# Patient Record
Sex: MALE | Race: WHITE | NOT HISPANIC OR LATINO | Employment: FULL TIME | URBAN - METROPOLITAN AREA
[De-identification: names, ages, dates, MRNs, and addresses within clinical notes are randomized per-mention and may not be internally consistent; named-entity substitution may affect disease eponyms.]

---

## 2019-06-25 ENCOUNTER — APPOINTMENT (OUTPATIENT)
Dept: RADIOLOGY | Facility: MEDICAL CENTER | Age: 39
End: 2019-06-25
Attending: EMERGENCY MEDICINE
Payer: MEDICAID

## 2019-06-25 ENCOUNTER — HOSPITAL ENCOUNTER (EMERGENCY)
Facility: MEDICAL CENTER | Age: 39
End: 2019-06-25
Attending: EMERGENCY MEDICINE
Payer: MEDICAID

## 2019-06-25 VITALS
HEIGHT: 70 IN | OXYGEN SATURATION: 94 % | TEMPERATURE: 98 F | WEIGHT: 173.72 LBS | BODY MASS INDEX: 24.87 KG/M2 | RESPIRATION RATE: 16 BRPM | DIASTOLIC BLOOD PRESSURE: 66 MMHG | HEART RATE: 64 BPM | SYSTOLIC BLOOD PRESSURE: 103 MMHG

## 2019-06-25 DIAGNOSIS — S69.91XA INJURY OF RIGHT WRIST, INITIAL ENCOUNTER: ICD-10-CM

## 2019-06-25 PROCEDURE — 99283 EMERGENCY DEPT VISIT LOW MDM: CPT

## 2019-06-25 PROCEDURE — 73110 X-RAY EXAM OF WRIST: CPT | Mod: RT

## 2019-06-25 NOTE — ED NOTES
Pt discharged, discharge instructions given. Splint in place. Verbalizes understanding. VSS on RA. All belongings accounted for. Pt ambulated with steady gait to ED lobby.

## 2019-06-25 NOTE — DISCHARGE INSTRUCTIONS
Your x-ray appears normal.  Use the wrist brace at all times, except when showering, for the next week.  Use the contact information above to schedule follow-up with orthopedics if pain persists for more than 7 to 10 days.

## 2019-06-25 NOTE — ED TRIAGE NOTES
"Chief Complaint   Patient presents with   • Wrist Pain     R side. Pt states he hurt his wrist 2 days ago while playing with his kids. Reports pain with movement. Denies numbness or tingling.     /65   Pulse 66   Temp 36.7 °C (98 °F) (Temporal)   Resp 14   Ht 1.778 m (5' 10\")   Wt 78.8 kg (173 lb 11.6 oz)   SpO2 94%   BMI 24.93 kg/m²     Pt ambulated into triage, pt reports having similar injury ~2months ago and was seen at [facility] with no fracture. VS as above, NAD, encouraged to return to the triage nurse or tech with any new complaints or symptoms.  "

## 2019-06-25 NOTE — ED PROVIDER NOTES
"ED Provider Note    Scribed for Sushant Wheatley M.D. by Esperanza Black. 6/25/2019  2:20 PM    CHIEF COMPLAINT  Chief Complaint   Patient presents with   • Wrist Pain     R side. Pt states he hurt his wrist 2 days ago while playing with his kids. Reports pain with movement. Denies numbness or tingling.       HPI  Fortino Song is a 38 y.o. male who presents to the Emergency Room complaining of right wrist pain onset 2 days ago. The patient states he was playing with his kids when the pain began shortly after. He sustained a sprain to the same wrist 2-3 months ago which is in the process of healing. He states the pain is exacerbated with movement and palpation. No known drug allergies.      REVIEW OF SYSTEMS  See HPI for further details.    PAST MEDICAL HISTORY   has a past medical history of Back pain; Broken tooth; HIV (human immunodeficiency virus infection) (HCC); Psychiatric disorder; and Substance abuse ([facility]).    SOCIAL HISTORY  Social History     Social History Main Topics   • Smoking status: Current Every Day Smoker     Packs/day: 0.50     Years: 20.00     Types: Cigarettes   • Smokeless tobacco: Never Used   • Alcohol use No      Comment: occ; quit \"a while ago\"   • Drug use: Yes     Types: Oral, Inhaled, Intravenous      Comment: meth.marijuana, ecstasy   • Sexual activity: Not on file       SURGICAL HISTORY   has a past surgical history that includes exploratory laparotomy (N/A, 10/14/2015) and exploratory laparotomy (10/14/2015).    CURRENT MEDICATIONS  Home Medications     Reviewed by Adelaida Jasso R.N. (Registered Nurse) on 06/25/19 at 1411  Med List Status: Partial   Medication Last Dose Status   Atomoxetine HCl (STRATTERA PO)  Active   lamoTRIgine (LAMICTAL PO)  Active                ALLERGIES  Allergies   Allergen Reactions   • Nkda [No Known Drug Allergy]        PHYSICAL EXAM  VITAL SIGNS: /65   Pulse 66   Temp 36.7 °C (98 °F) (Temporal)   Resp 14   Ht 1.778 m (5' 10\")   Wt 78.8 " kg (173 lb 11.6 oz)   SpO2 94%   BMI 24.93 kg/m²   Pulse ox interpretation: I interpret this pulse ox as normal.  Constitutional: Alert in no apparent distress.  HENT: No signs of trauma, Bilateral external ears normal, Nose normal.   Eyes: Pupils are equal and reactive, Conjunctiva normal, Non-icteric.   Neck: Normal range of motion, No tenderness, Supple, No stridor.    Cardiovascular: Normal peripheral perfusion  Thorax & Lungs: Unlabored respirations, equal chest expansion, no accessory muscle use  Abdomen: Non-distended  Skin:  No erythema, No rash.   Back: Normal alignment and ROM  Extremities: No visible palpable abnormalities of hand or wrist. No scaphoid tenderness. Brisk pulses and capillary refill. Range of motion slightly limited by pain.   Musculoskeletal: Good range of motion in all major joints.   Neurologic: Alert, Normal motor function, No focal deficits noted.   Psychiatric: Affect normal, Judgment normal, Mood normal.    RADIOLOGY  DX-WRIST-COMPLETE 3+ RIGHT   Final Result      No acute fracture identified.  If pain persists, recommend repeat imaging in 7 to 10 days.          COURSE & MEDICAL DECISION MAKING   The patient's VS, Nurses notes reviewed. (See chart for details)    2:20 PM Patient seen and examined at bedside. Ordered for Dx- Wrist Right to evaluate. Differential diagnoses include but not limited to: fracture vs. Contusion vs. sprain    2:58 PM - Re-examined; The patient is resting in bed. I informed the patient that his x-ray is normal. He should keep his wrist in the brace at all times except when showering for 1 week. I discussed my plans for discharge with a referral to orthopedics. He was advised to follow up with his PCP and instructed to return to the ED if his symptoms worsen. Patient understands and agrees.        The patient will return for new or worsening symptoms and is stable at the time of discharge.        DISPOSITION:  Patient will be discharged home in stable  condition.    FOLLOW UP:  Han Tejada M.D.  [address]  [address]  [facility] [address]  [phone]                FINAL IMPRESSION  1. Injury of right wrist, initial encounter         IEsperanza (Scribe), am scribing for, and in the presence of, Sushant Wheatley M.D..    Electronically signed by: Esperanza Black (Scribe), 6/25/2019    I, Sushant Wheatley M.D. personally performed the services described in this documentation, as scribed by Esperanza Black in my presence, and it is both accurate and complete.  E  The note accurately reflects work and decisions made by me.  Sushant Wheatley  6/25/2019  4:46 PM

## 2019-09-08 ENCOUNTER — HOSPITAL ENCOUNTER (EMERGENCY)
Facility: MEDICAL CENTER | Age: 39
End: 2019-09-08
Attending: EMERGENCY MEDICINE
Payer: MEDICAID

## 2019-09-08 VITALS
HEIGHT: 70 IN | SYSTOLIC BLOOD PRESSURE: 110 MMHG | WEIGHT: 179.01 LBS | TEMPERATURE: 97.5 F | OXYGEN SATURATION: 96 % | RESPIRATION RATE: 16 BRPM | HEART RATE: 59 BPM | BODY MASS INDEX: 25.63 KG/M2 | DIASTOLIC BLOOD PRESSURE: 48 MMHG

## 2019-09-08 DIAGNOSIS — J03.90 TONSILLITIS: ICD-10-CM

## 2019-09-08 LAB — S PYO DNA SPEC NAA+PROBE: NOT DETECTED

## 2019-09-08 PROCEDURE — 700111 HCHG RX REV CODE 636 W/ 250 OVERRIDE (IP): Performed by: EMERGENCY MEDICINE

## 2019-09-08 PROCEDURE — 87651 STREP A DNA AMP PROBE: CPT

## 2019-09-08 PROCEDURE — 99284 EMERGENCY DEPT VISIT MOD MDM: CPT

## 2019-09-08 RX ORDER — DEXAMETHASONE SODIUM PHOSPHATE 10 MG/ML
10 INJECTION, SOLUTION INTRAMUSCULAR; INTRAVENOUS ONCE
Status: COMPLETED | OUTPATIENT
Start: 2019-09-08 | End: 2019-09-08

## 2019-09-08 RX ADMIN — DEXAMETHASONE SODIUM PHOSPHATE 10 MG: 10 INJECTION INTRAMUSCULAR; INTRAVENOUS at 07:44

## 2019-09-08 ASSESSMENT — LIFESTYLE VARIABLES: DO YOU DRINK ALCOHOL: NO

## 2019-09-08 NOTE — ED NOTES
Pt ambulatory with steady gait to GR 39, pt sitting on gurney, on monitor, call light in reach, chart up for ERP.

## 2019-09-08 NOTE — ED TRIAGE NOTES
Chief Complaint   Patient presents with   • Sore Throat     x 1 week with painful swallowing. Redness/ swelling noted to tonsills R>L, small white patches noted. Denies fever.      Pt ambulatory to triage with above complaint. Pt swallowing secretions without difficulty. Educated on triage process, encouraged to inform staff of any changes.

## 2019-09-08 NOTE — ED PROVIDER NOTES
"ED Provider Note    CHIEF COMPLAINT  Chief Complaint   Patient presents with   • Sore Throat     x 1 week with painful swallowing. Redness/ swelling noted to tonsills R>L, small white patches noted. Denies fever.        HPI  Fortino Song is a 38 y.o. male who presents with sore throat.  Started about 5 days ago.  Diffuse redness with a hoarse voice.  No fever.  Minimal cough.  No runny nose.  No difficulty breathing.  Thought maybe was exposed to some chemicals, but symptoms did not get better.  He does have a known exposure to hand-foot-and-mouth.  No headache or neck stiffness.  No chest pain.    REVIEW OF SYSTEMS  Pertinent negative: As above    PAST MEDICAL HISTORY  Past Medical History:   Diagnosis Date   • Back pain    • Broken tooth    • HIV (human immunodeficiency virus infection) (HCC)    • Psychiatric disorder     bipolar, schizo affective   • Substance abuse (HCC)      History of HIV.  Compliant with his antiretrovirals.  Last CD4 1150 by his report.    SOCIAL HISTORY  Social History     Tobacco Use   • Smoking status: Current Every Day Smoker     Packs/day: 0.50     Years: 20.00     Pack years: 10.00     Types: Cigarettes   • Smokeless tobacco: Current User   Substance Use Topics   • Alcohol use: No     Comment: occ; quit \"a while ago\"   • Drug use: Yes     Types: Oral, Inhaled     Comment: marijuana       SURGICAL HISTORY  Past Surgical History:   Procedure Laterality Date   • EXPLORATORY LAPAROTOMY N/A 10/14/2015    Procedure: EXPLORATORY LAPAROTOMY;  Surgeon: Mo Khan M.D.;  Location: SURGERY [facility];  Service:    • EXPLORATORY LAPAROTOMY  10/14/2015       ALLERGIES  Allergies   Allergen Reactions   • Nkda [No Known Drug Allergy]        PHYSICAL EXAM  VITAL SIGNS: /48   Pulse (!) 59   Temp 36.4 °C (97.5 °F) (Temporal)   Resp 16   Ht 1.778 m (5' 10\")   Wt 81.2 kg (179 lb 0.2 oz)   SpO2 96%   BMI 25.69 kg/m²    Constitutional: Awake and alert. Nontoxic  HENT: Minimal diffuse " pharyngeal erythema with a few small white patches on both tonsils.  The right tonsil is slightly larger than the left without abscess.  No uvular shift.  Airway is widely patent.  Eyes: Grossly normal  Neck: Normal range of motion, no lymphadenopathy  Cardiovascular: Normal heart rate   Thorax & Lungs: No respiratory distress, lung fields are clear without wheezes rales or rhonchi.  He does have a hoarse voice  Abdomen: Nontender  Skin:  No pathologic rash.   Extremities: Well perfused  Psychiatric: Affect normal      COURSE & MEDICAL DECISION MAKING  Patient presents with pharyngitis/tonsillitis.  Strep swab was obtained and is negative.  I suspect this is a viral process.  Does not appear to be candidal.  He has been compliant with his antiretrovirals.  At this point management is symptomatic.  He was given oral Decadron while in the ER to decrease inflammation.  Of advised push fluids and Tylenol as needed.  He should return to the ER for fevers, any double to breathing, worsening symptoms not improving.  I advised recheck with primary in 1 week if not well.    FINAL IMPRESSION  1.  Viral pharyngitis/tonsillitis      Disposition: home in good condition      This dictation was created using voice recognition software. The accuracy of the dictation is limited to the abilities of the software.  The nursing notes were reviewed and certain aspects of this information were incorporated into this note.      Electronically signed by: Jose Alberto Nuñez, 9/8/2019 7:43 AM

## 2020-01-12 ENCOUNTER — HOSPITAL ENCOUNTER (EMERGENCY)
Facility: MEDICAL CENTER | Age: 40
End: 2020-01-12
Attending: EMERGENCY MEDICINE
Payer: MEDICAID

## 2020-01-12 ENCOUNTER — APPOINTMENT (OUTPATIENT)
Dept: RADIOLOGY | Facility: MEDICAL CENTER | Age: 40
End: 2020-01-12
Attending: EMERGENCY MEDICINE
Payer: MEDICAID

## 2020-01-12 VITALS
WEIGHT: 160.5 LBS | BODY MASS INDEX: 22.98 KG/M2 | OXYGEN SATURATION: 99 % | HEIGHT: 70 IN | RESPIRATION RATE: 16 BRPM | SYSTOLIC BLOOD PRESSURE: 121 MMHG | DIASTOLIC BLOOD PRESSURE: 71 MMHG | TEMPERATURE: 98.1 F | HEART RATE: 70 BPM

## 2020-01-12 DIAGNOSIS — S99.922A INJURY OF LEFT FOOT, INITIAL ENCOUNTER: ICD-10-CM

## 2020-01-12 DIAGNOSIS — S69.92XA INJURY OF LEFT WRIST, INITIAL ENCOUNTER: ICD-10-CM

## 2020-01-12 PROCEDURE — 90471 IMMUNIZATION ADMIN: CPT

## 2020-01-12 PROCEDURE — 700111 HCHG RX REV CODE 636 W/ 250 OVERRIDE (IP): Performed by: EMERGENCY MEDICINE

## 2020-01-12 PROCEDURE — 73110 X-RAY EXAM OF WRIST: CPT | Mod: LT

## 2020-01-12 PROCEDURE — 99283 EMERGENCY DEPT VISIT LOW MDM: CPT

## 2020-01-12 PROCEDURE — 90715 TDAP VACCINE 7 YRS/> IM: CPT | Performed by: EMERGENCY MEDICINE

## 2020-01-12 RX ADMIN — CLOSTRIDIUM TETANI TOXOID ANTIGEN (FORMALDEHYDE INACTIVATED), CORYNEBACTERIUM DIPHTHERIAE TOXOID ANTIGEN (FORMALDEHYDE INACTIVATED), BORDETELLA PERTUSSIS TOXOID ANTIGEN (GLUTARALDEHYDE INACTIVATED), BORDETELLA PERTUSSIS FILAMENTOUS HEMAGGLUTININ ANTIGEN (FORMALDEHYDE INACTIVATED), BORDETELLA PERTUSSIS PERTACTIN ANTIGEN, AND BORDETELLA PERTUSSIS FIMBRIAE 2/3 ANTIGEN 0.5 ML: 5; 2; 2.5; 5; 3; 5 INJECTION, SUSPENSION INTRAMUSCULAR at 13:37

## 2020-01-12 NOTE — ED TRIAGE NOTES
Ambulates to triage  Chief Complaint   Patient presents with   • Wrist Pain     injured his L wrist 1 week ago at work and has had pain and numbness shooting down his hand     No swelling noted, he hit it on a laundry bin last week. Hiro said he stepped on a nail yesterday, unknown when his last tetanus shot was.

## 2020-01-12 NOTE — ED NOTES
Pt brought back to rm PUR 75 from triage. Pt able to transfer self to bed, family at bedside, call light in reach. ERP at bedside.

## 2020-01-12 NOTE — DISCHARGE INSTRUCTIONS
Apply warm and cold packs to the area that hurts and use Tylenol and Motrin for discomfort.  If your symptoms have not completely resolved within 1 week call the orthopedic clinic and arrange follow-up

## 2020-01-12 NOTE — ED PROVIDER NOTES
"ED Provider Note    CHIEF COMPLAINT  Chief Complaint   Patient presents with   • Wrist Pain     injured his L wrist 1 week ago at work and has had pain and numbness shooting down his hand       HPI  Fortino Song is a 39 y.o. male who presents to the emergency department complaining of left wrist pain.  The patient states that he struck his left wrist on the ulnar side on a laundry bin at work more than 1 week ago since that time he has had pain on the ulnar aspect of the wrist and the pain shoots down into the hand and he feels the numbness in the hand when this happens.  In addition to this the patient says [place] but he stepped on a nail that went through the bottom of the shoe and struck the bottom of his left foot but it did not actually leave a puncture wound.    REVIEW OF SYSTEMS no other injuries or mechanisms    PAST MEDICAL HISTORY  Past Medical History:   Diagnosis Date   • Back pain    • Broken tooth    • HIV (human immunodeficiency virus infection) ([facility])    • Psychiatric disorder     bipolar, schizo affective   • Substance abuse ([facility])        FAMILY HISTORY  History reviewed. No pertinent family history.    SOCIAL HISTORY  Social History     Socioeconomic History   • Marital status:      Spouse name: Not on file   • Number of children: Not on file   • Years of education: Not on file   • Highest education level: Not on file   Occupational History   • Not on file   Social Needs   • Financial resource strain: Not on file   • Food insecurity:     Worry: Not on file     Inability: Not on file   • Transportation needs:     Medical: Not on file     Non-medical: Not on file   Tobacco Use   • Smoking status: Current Every Day Smoker     Packs/day: 0.50     Years: 20.00     Pack years: 10.00     Types: Cigarettes   • Smokeless tobacco: Current User   Substance and Sexual Activity   • Alcohol use: No     Comment: occ; quit \"a while ago\"   • Drug use: Not Currently     Types: Oral, " "Inhaled     Comment: marijuana   • Sexual activity: Not on file   Lifestyle   • Physical activity:     Days per week: Not on file     Minutes per session: Not on file   • Stress: Not on file   Relationships   • Social connections:     Talks on phone: Not on file     Gets together: Not on file     Attends Yarsani service: Not on file     Active member of club or organization: Not on file     Attends meetings of clubs or organizations: Not on file     Relationship status: Not on file   • Intimate partner violence:     Fear of current or ex partner: Not on file     Emotionally abused: Not on file     Physically abused: Not on file     Forced sexual activity: Not on file   Other Topics Concern   • Not on file   Social History Narrative    ** Merged History Encounter **            SURGICAL HISTORY  Past Surgical History:   Procedure Laterality Date   • EXPLORATORY LAPAROTOMY N/A 10/14/2015    Procedure: EXPLORATORY LAPAROTOMY;  Surgeon: Mo Khan M.D.;  Location: SURGERY [facility];  Service:    • EXPLORATORY LAPAROTOMY  10/14/2015       CURRENT MEDICATIONS  Home Medications     Reviewed by Adelaida Jasso R.N. (Registered Nurse) on 01/12/20 at 1307  Med List Status: Complete   Medication Last Dose Status        Patient Alejandro Taking any Medications                       ALLERGIES  Allergies   Allergen Reactions   • Nkda [No Known Drug Allergy]        PHYSICAL EXAM  VITAL SIGNS: /71   Pulse 70   Temp 36.7 °C (98.1 °F) (Temporal)   Resp 16   Ht 1.778 m (5' 10\")   Wt 72.8 kg (160 lb 7.9 oz)   SpO2 99%   BMI 23.03 kg/m²    Oxygen saturation is interpreted as adequate  Constitutional: Awake verbal nontoxic-appearing  Musculoskeletal: The left wrist looks normal there is no swelling or erythema but he does have tenderness over the ulnar aspect of the wrist.  Motor function is intact in the hand.  There is no tenderness of the proximal forearm or elbow.  On the left foot there is no evidence of " puncture wound or erythema or swelling    Radiology  DX-WRIST-COMPLETE 3+ LEFT   Final Result      1.   Unremarkable left wrist series.        MEDICAL DECISION MAKING and DISPOSITION  In the emergency department the patient's tetanus booster was updated.  I have recommended Tylenol and Motrin as well as warm and cold compresses on the area that hurts.  He is to call Dr. Damon's office and arrange orthopedic follow-up during the week for his left wrist injury if it is not completely better by then.  I have filled out a C4 injury form for this patient    IMPRESSION  1.  Left wrist injury  2.  Left foot injury         Electronically signed by: Jung Clayton, 1/12/2020 2:15 PM

## 2020-02-01 ENCOUNTER — HOSPITAL ENCOUNTER (INPATIENT)
Facility: MEDICAL CENTER | Age: 40
LOS: 3 days | DRG: 872 | End: 2020-02-04
Attending: EMERGENCY MEDICINE | Admitting: INTERNAL MEDICINE
Payer: MEDICAID

## 2020-02-01 ENCOUNTER — APPOINTMENT (OUTPATIENT)
Dept: RADIOLOGY | Facility: MEDICAL CENTER | Age: 40
DRG: 872 | End: 2020-02-01
Attending: EMERGENCY MEDICINE
Payer: MEDICAID

## 2020-02-01 DIAGNOSIS — R50.9 ACUTE FEBRILE ILLNESS: ICD-10-CM

## 2020-02-01 DIAGNOSIS — B20 SYMPTOMATIC HIV INFECTION (HCC): ICD-10-CM

## 2020-02-01 DIAGNOSIS — J02.0 STREP PHARYNGITIS: ICD-10-CM

## 2020-02-01 PROBLEM — F17.200 SMOKING: Status: ACTIVE | Noted: 2020-02-01

## 2020-02-01 PROBLEM — Z21 HIV (HUMAN IMMUNODEFICIENCY VIRUS INFECTION) (HCC): Status: ACTIVE | Noted: 2020-02-01

## 2020-02-01 PROBLEM — A41.9 SEPSIS (HCC): Status: ACTIVE | Noted: 2020-02-01

## 2020-02-01 PROBLEM — F31.9 BIPOLAR DISORDER (HCC): Status: ACTIVE | Noted: 2020-02-01

## 2020-02-01 PROBLEM — E87.1 HYPONATREMIA: Status: ACTIVE | Noted: 2020-02-01

## 2020-02-01 PROBLEM — R73.9 HYPERGLYCEMIA: Status: ACTIVE | Noted: 2020-02-01

## 2020-02-01 LAB
ALBUMIN SERPL BCP-MCNC: 4.5 G/DL (ref 3.2–4.9)
ALBUMIN/GLOB SERPL: 1.3 G/DL
ALP SERPL-CCNC: 70 U/L (ref 30–99)
ALT SERPL-CCNC: 35 U/L (ref 2–50)
ANION GAP SERPL CALC-SCNC: 9 MMOL/L (ref 0–11.9)
APPEARANCE UR: CLEAR
AST SERPL-CCNC: 25 U/L (ref 12–45)
BACTERIA #/AREA URNS HPF: NEGATIVE /HPF
BASOPHILS # BLD AUTO: 0.4 % (ref 0–1.8)
BASOPHILS # BLD: 0.08 K/UL (ref 0–0.12)
BILIRUB SERPL-MCNC: 0.8 MG/DL (ref 0.1–1.5)
BILIRUB UR QL STRIP.AUTO: NEGATIVE
BUN SERPL-MCNC: 16 MG/DL (ref 8–22)
CALCIUM SERPL-MCNC: 10 MG/DL (ref 8.5–10.5)
CHLORIDE SERPL-SCNC: 98 MMOL/L (ref 96–112)
CO2 SERPL-SCNC: 22 MMOL/L (ref 20–33)
COLOR UR: YELLOW
CREAT SERPL-MCNC: 1.05 MG/DL (ref 0.5–1.4)
EOSINOPHIL # BLD AUTO: 0 K/UL (ref 0–0.51)
EOSINOPHIL NFR BLD: 0 % (ref 0–6.9)
EPI CELLS #/AREA URNS HPF: NEGATIVE /HPF
ERYTHROCYTE [DISTWIDTH] IN BLOOD BY AUTOMATED COUNT: 42.2 FL (ref 35.9–50)
FLUAV RNA SPEC QL NAA+PROBE: NEGATIVE
FLUBV RNA SPEC QL NAA+PROBE: NEGATIVE
GLOBULIN SER CALC-MCNC: 3.5 G/DL (ref 1.9–3.5)
GLUCOSE SERPL-MCNC: 147 MG/DL (ref 65–99)
GLUCOSE UR STRIP.AUTO-MCNC: NEGATIVE MG/DL
HCT VFR BLD AUTO: 45.6 % (ref 42–52)
HGB BLD-MCNC: 15.7 G/DL (ref 14–18)
HYALINE CASTS #/AREA URNS LPF: ABNORMAL /LPF
IMM GRANULOCYTES # BLD AUTO: 0.16 K/UL (ref 0–0.11)
IMM GRANULOCYTES NFR BLD AUTO: 0.7 % (ref 0–0.9)
INR PPP: 1.09 (ref 0.87–1.13)
KETONES UR STRIP.AUTO-MCNC: NEGATIVE MG/DL
LACTATE BLD-SCNC: 1 MMOL/L (ref 0.5–2)
LACTATE BLD-SCNC: 1.3 MMOL/L (ref 0.5–2)
LEUKOCYTE ESTERASE UR QL STRIP.AUTO: NEGATIVE
LYMPHOCYTES # BLD AUTO: 2.2 K/UL (ref 1–4.8)
LYMPHOCYTES NFR BLD: 10.2 % (ref 22–41)
MCH RBC QN AUTO: 30.8 PG (ref 27–33)
MCHC RBC AUTO-ENTMCNC: 34.4 G/DL (ref 33.7–35.3)
MCV RBC AUTO: 89.4 FL (ref 81.4–97.8)
MICRO URNS: ABNORMAL
MONOCYTES # BLD AUTO: 1.13 K/UL (ref 0–0.85)
MONOCYTES NFR BLD AUTO: 5.2 % (ref 0–13.4)
NEUTROPHILS # BLD AUTO: 17.97 K/UL (ref 1.82–7.42)
NEUTROPHILS NFR BLD: 83.5 % (ref 44–72)
NITRITE UR QL STRIP.AUTO: NEGATIVE
NRBC # BLD AUTO: 0 K/UL
NRBC BLD-RTO: 0 /100 WBC
PH UR STRIP.AUTO: 5.5 [PH] (ref 5–8)
PLATELET # BLD AUTO: 179 K/UL (ref 164–446)
PMV BLD AUTO: 9.6 FL (ref 9–12.9)
POTASSIUM SERPL-SCNC: 4.4 MMOL/L (ref 3.6–5.5)
PROT SERPL-MCNC: 8 G/DL (ref 6–8.2)
PROT UR QL STRIP: NEGATIVE MG/DL
PROTHROMBIN TIME: 14.3 SEC (ref 12–14.6)
RBC # BLD AUTO: 5.1 M/UL (ref 4.7–6.1)
RBC # URNS HPF: ABNORMAL /HPF
RBC UR QL AUTO: ABNORMAL
S PYO DNA SPEC NAA+PROBE: DETECTED
SODIUM SERPL-SCNC: 129 MMOL/L (ref 135–145)
SP GR UR STRIP.AUTO: 1.01
UROBILINOGEN UR STRIP.AUTO-MCNC: 0.2 MG/DL
WBC # BLD AUTO: 21.5 K/UL (ref 4.8–10.8)
WBC #/AREA URNS HPF: ABNORMAL /HPF

## 2020-02-01 PROCEDURE — 700105 HCHG RX REV CODE 258: Performed by: EMERGENCY MEDICINE

## 2020-02-01 PROCEDURE — 87502 INFLUENZA DNA AMP PROBE: CPT

## 2020-02-01 PROCEDURE — 85025 COMPLETE CBC W/AUTO DIFF WBC: CPT

## 2020-02-01 PROCEDURE — 85610 PROTHROMBIN TIME: CPT

## 2020-02-01 PROCEDURE — 83036 HEMOGLOBIN GLYCOSYLATED A1C: CPT

## 2020-02-01 PROCEDURE — 87651 STREP A DNA AMP PROBE: CPT

## 2020-02-01 PROCEDURE — 87086 URINE CULTURE/COLONY COUNT: CPT

## 2020-02-01 PROCEDURE — 83605 ASSAY OF LACTIC ACID: CPT

## 2020-02-01 PROCEDURE — 700102 HCHG RX REV CODE 250 W/ 637 OVERRIDE(OP): Performed by: INTERNAL MEDICINE

## 2020-02-01 PROCEDURE — 80053 COMPREHEN METABOLIC PANEL: CPT

## 2020-02-01 PROCEDURE — 96375 TX/PRO/DX INJ NEW DRUG ADDON: CPT

## 2020-02-01 PROCEDURE — 87040 BLOOD CULTURE FOR BACTERIA: CPT

## 2020-02-01 PROCEDURE — 99223 1ST HOSP IP/OBS HIGH 75: CPT | Mod: 25 | Performed by: INTERNAL MEDICINE

## 2020-02-01 PROCEDURE — 99406 BEHAV CHNG SMOKING 3-10 MIN: CPT | Mod: 25 | Performed by: INTERNAL MEDICINE

## 2020-02-01 PROCEDURE — 96365 THER/PROPH/DIAG IV INF INIT: CPT

## 2020-02-01 PROCEDURE — 700102 HCHG RX REV CODE 250 W/ 637 OVERRIDE(OP)

## 2020-02-01 PROCEDURE — 36415 COLL VENOUS BLD VENIPUNCTURE: CPT

## 2020-02-01 PROCEDURE — 71045 X-RAY EXAM CHEST 1 VIEW: CPT

## 2020-02-01 PROCEDURE — A9270 NON-COVERED ITEM OR SERVICE: HCPCS

## 2020-02-01 PROCEDURE — 700105 HCHG RX REV CODE 258: Performed by: INTERNAL MEDICINE

## 2020-02-01 PROCEDURE — A9270 NON-COVERED ITEM OR SERVICE: HCPCS | Performed by: INTERNAL MEDICINE

## 2020-02-01 PROCEDURE — 99285 EMERGENCY DEPT VISIT HI MDM: CPT

## 2020-02-01 PROCEDURE — 770001 HCHG ROOM/CARE - MED/SURG/GYN PRIV*

## 2020-02-01 PROCEDURE — 700111 HCHG RX REV CODE 636 W/ 250 OVERRIDE (IP): Performed by: EMERGENCY MEDICINE

## 2020-02-01 PROCEDURE — 81001 URINALYSIS AUTO W/SCOPE: CPT

## 2020-02-01 RX ORDER — ACETAMINOPHEN 325 MG/1
650 TABLET ORAL ONCE
Status: COMPLETED | OUTPATIENT
Start: 2020-02-01 | End: 2020-02-01

## 2020-02-01 RX ORDER — SODIUM CHLORIDE, SODIUM LACTATE, POTASSIUM CHLORIDE, AND CALCIUM CHLORIDE .6; .31; .03; .02 G/100ML; G/100ML; G/100ML; G/100ML
1000 INJECTION, SOLUTION INTRAVENOUS
Status: DISCONTINUED | OUTPATIENT
Start: 2020-02-01 | End: 2020-02-04 | Stop reason: HOSPADM

## 2020-02-01 RX ORDER — LORAZEPAM 0.5 MG/1
0.5 TABLET ORAL
Status: DISCONTINUED | OUTPATIENT
Start: 2020-02-01 | End: 2020-02-04 | Stop reason: HOSPADM

## 2020-02-01 RX ORDER — POLYETHYLENE GLYCOL 3350 17 G/17G
1 POWDER, FOR SOLUTION ORAL
Status: DISCONTINUED | OUTPATIENT
Start: 2020-02-01 | End: 2020-02-04 | Stop reason: HOSPADM

## 2020-02-01 RX ORDER — SODIUM CHLORIDE 9 MG/ML
INJECTION, SOLUTION INTRAVENOUS CONTINUOUS
Status: DISCONTINUED | OUTPATIENT
Start: 2020-02-01 | End: 2020-02-03

## 2020-02-01 RX ORDER — ONDANSETRON 2 MG/ML
4 INJECTION INTRAMUSCULAR; INTRAVENOUS ONCE
Status: ACTIVE | OUTPATIENT
Start: 2020-02-01 | End: 2020-02-02

## 2020-02-01 RX ORDER — PROCHLORPERAZINE EDISYLATE 5 MG/ML
5-10 INJECTION INTRAMUSCULAR; INTRAVENOUS EVERY 4 HOURS PRN
Status: DISCONTINUED | OUTPATIENT
Start: 2020-02-01 | End: 2020-02-04 | Stop reason: HOSPADM

## 2020-02-01 RX ORDER — OXYCODONE HYDROCHLORIDE 10 MG/1
10 TABLET ORAL
Status: DISCONTINUED | OUTPATIENT
Start: 2020-02-01 | End: 2020-02-04 | Stop reason: HOSPADM

## 2020-02-01 RX ORDER — LAMOTRIGINE 50(42)-100
50 KIT ORAL 2 TIMES DAILY
COMMUNITY

## 2020-02-01 RX ORDER — GUAIFENESIN/DEXTROMETHORPHAN 100-10MG/5
10 SYRUP ORAL EVERY 6 HOURS PRN
Status: DISCONTINUED | OUTPATIENT
Start: 2020-02-01 | End: 2020-02-04 | Stop reason: HOSPADM

## 2020-02-01 RX ORDER — MORPHINE SULFATE 4 MG/ML
4 INJECTION, SOLUTION INTRAMUSCULAR; INTRAVENOUS
Status: DISCONTINUED | OUTPATIENT
Start: 2020-02-01 | End: 2020-02-04 | Stop reason: HOSPADM

## 2020-02-01 RX ORDER — OXYCODONE HYDROCHLORIDE 5 MG/1
5 TABLET ORAL
Status: DISCONTINUED | OUTPATIENT
Start: 2020-02-01 | End: 2020-02-04 | Stop reason: HOSPADM

## 2020-02-01 RX ORDER — ONDANSETRON 4 MG/1
4 TABLET, ORALLY DISINTEGRATING ORAL EVERY 4 HOURS PRN
Status: DISCONTINUED | OUTPATIENT
Start: 2020-02-01 | End: 2020-02-04 | Stop reason: HOSPADM

## 2020-02-01 RX ORDER — ONDANSETRON 2 MG/ML
4 INJECTION INTRAMUSCULAR; INTRAVENOUS ONCE
Status: COMPLETED | OUTPATIENT
Start: 2020-02-01 | End: 2020-02-01

## 2020-02-01 RX ORDER — AMOXICILLIN 250 MG
2 CAPSULE ORAL 2 TIMES DAILY
Status: DISCONTINUED | OUTPATIENT
Start: 2020-02-01 | End: 2020-02-04 | Stop reason: HOSPADM

## 2020-02-01 RX ORDER — LAMOTRIGINE 100 MG/1
50 TABLET ORAL 2 TIMES DAILY
Status: DISCONTINUED | OUTPATIENT
Start: 2020-02-01 | End: 2020-02-04 | Stop reason: HOSPADM

## 2020-02-01 RX ORDER — PROMETHAZINE HYDROCHLORIDE 25 MG/1
12.5-25 TABLET ORAL EVERY 4 HOURS PRN
Status: DISCONTINUED | OUTPATIENT
Start: 2020-02-01 | End: 2020-02-04 | Stop reason: HOSPADM

## 2020-02-01 RX ORDER — ACETAMINOPHEN 325 MG/1
650 TABLET ORAL EVERY 6 HOURS PRN
Status: DISCONTINUED | OUTPATIENT
Start: 2020-02-01 | End: 2020-02-04 | Stop reason: HOSPADM

## 2020-02-01 RX ORDER — ACETAMINOPHEN 500 MG
1000 TABLET ORAL EVERY 6 HOURS PRN
COMMUNITY

## 2020-02-01 RX ORDER — SODIUM CHLORIDE 9 MG/ML
1000 INJECTION, SOLUTION INTRAVENOUS ONCE
Status: COMPLETED | OUTPATIENT
Start: 2020-02-01 | End: 2020-02-01

## 2020-02-01 RX ORDER — EMTRICITABINE AND TENOFOVIR DISOPROXIL FUMARATE 200; 300 MG/1; MG/1
1 TABLET, FILM COATED ORAL DAILY
Status: DISCONTINUED | OUTPATIENT
Start: 2020-02-02 | End: 2020-02-04 | Stop reason: HOSPADM

## 2020-02-01 RX ORDER — SODIUM CHLORIDE, SODIUM LACTATE, POTASSIUM CHLORIDE, AND CALCIUM CHLORIDE .6; .31; .03; .02 G/100ML; G/100ML; G/100ML; G/100ML
30 INJECTION, SOLUTION INTRAVENOUS
Status: DISCONTINUED | OUTPATIENT
Start: 2020-02-01 | End: 2020-02-04 | Stop reason: HOSPADM

## 2020-02-01 RX ORDER — BISACODYL 10 MG
10 SUPPOSITORY, RECTAL RECTAL
Status: DISCONTINUED | OUTPATIENT
Start: 2020-02-01 | End: 2020-02-04 | Stop reason: HOSPADM

## 2020-02-01 RX ORDER — ONDANSETRON 2 MG/ML
4 INJECTION INTRAMUSCULAR; INTRAVENOUS EVERY 4 HOURS PRN
Status: DISCONTINUED | OUTPATIENT
Start: 2020-02-01 | End: 2020-02-04 | Stop reason: HOSPADM

## 2020-02-01 RX ORDER — NICOTINE 21 MG/24HR
14 PATCH, TRANSDERMAL 24 HOURS TRANSDERMAL
Status: DISCONTINUED | OUTPATIENT
Start: 2020-02-02 | End: 2020-02-04 | Stop reason: HOSPADM

## 2020-02-01 RX ORDER — PROMETHAZINE HYDROCHLORIDE 25 MG/1
12.5-25 SUPPOSITORY RECTAL EVERY 4 HOURS PRN
Status: DISCONTINUED | OUTPATIENT
Start: 2020-02-01 | End: 2020-02-04 | Stop reason: HOSPADM

## 2020-02-01 RX ADMIN — SODIUM CHLORIDE: 9 INJECTION, SOLUTION INTRAVENOUS at 22:01

## 2020-02-01 RX ADMIN — ACETAMINOPHEN 650 MG: 325 TABLET, FILM COATED ORAL at 22:07

## 2020-02-01 RX ADMIN — OXYCODONE HYDROCHLORIDE 5 MG: 5 TABLET ORAL at 22:52

## 2020-02-01 RX ADMIN — LORAZEPAM 0.5 MG: 0.5 TABLET ORAL at 23:59

## 2020-02-01 RX ADMIN — CEFTRIAXONE SODIUM 2 G: 2 INJECTION, POWDER, FOR SOLUTION INTRAMUSCULAR; INTRAVENOUS at 17:47

## 2020-02-01 RX ADMIN — ACETAMINOPHEN 650 MG: 325 TABLET, FILM COATED ORAL at 15:07

## 2020-02-01 RX ADMIN — SODIUM CHLORIDE 1000 ML: 9 INJECTION, SOLUTION INTRAVENOUS at 17:47

## 2020-02-01 RX ADMIN — RALTEGRAVIR 400 MG: 400 TABLET, FILM COATED ORAL at 23:59

## 2020-02-01 RX ADMIN — ONDANSETRON 4 MG: 2 INJECTION INTRAMUSCULAR; INTRAVENOUS at 17:02

## 2020-02-01 RX ADMIN — LAMOTRIGINE 50 MG: 100 TABLET ORAL at 23:59

## 2020-02-01 ASSESSMENT — ENCOUNTER SYMPTOMS
DOUBLE VISION: 0
SPUTUM PRODUCTION: 1
NERVOUS/ANXIOUS: 0
DIARRHEA: 1
WEIGHT LOSS: 0
COUGH: 1
CHILLS: 0
TREMORS: 0
FEVER: 0
SPEECH CHANGE: 0
BRUISES/BLEEDS EASILY: 0
FOCAL WEAKNESS: 0
VOMITING: 1
HEADACHES: 0
PHOTOPHOBIA: 0
BACK PAIN: 0
NECK PAIN: 0
BLURRED VISION: 0
HEARTBURN: 0
SHORTNESS OF BREATH: 0
NAUSEA: 1
ORTHOPNEA: 0
HEMOPTYSIS: 0
SORE THROAT: 1
FLANK PAIN: 0
POLYDIPSIA: 0
HALLUCINATIONS: 0
PALPITATIONS: 0

## 2020-02-01 ASSESSMENT — LIFESTYLE VARIABLES
HOW MANY TIMES IN THE PAST YEAR HAVE YOU HAD 5 OR MORE DRINKS IN A DAY: 0
DOES PATIENT WANT TO STOP DRINKING: NO
CONSUMPTION TOTAL: NEGATIVE
AVERAGE NUMBER OF DAYS PER WEEK YOU HAVE A DRINK CONTAINING ALCOHOL: 0
EVER FELT BAD OR GUILTY ABOUT YOUR DRINKING: NO
ALCOHOL_USE: NO
TOTAL SCORE: 0
TOTAL SCORE: 0
EVER HAD A DRINK FIRST THING IN THE MORNING TO STEADY YOUR NERVES TO GET RID OF A HANGOVER: NO
ON A TYPICAL DAY WHEN YOU DRINK ALCOHOL HOW MANY DRINKS DO YOU HAVE: 0
HAVE YOU EVER FELT YOU SHOULD CUT DOWN ON YOUR DRINKING: NO
HAVE PEOPLE ANNOYED YOU BY CRITICIZING YOUR DRINKING: NO
SUBSTANCE_ABUSE: 0
EVER_SMOKED: YES
TOTAL SCORE: 0

## 2020-02-01 ASSESSMENT — COPD QUESTIONNAIRES
COPD SCREENING SCORE: 5
IN THE PAST 12 MONTHS DO YOU DO LESS THAN YOU USED TO BECAUSE OF YOUR BREATHING PROBLEMS: DISAGREE/UNSURE
DURING THE PAST 4 WEEKS HOW MUCH DID YOU FEEL SHORT OF BREATH: SOME OF THE TIME
HAVE YOU SMOKED AT LEAST 100 CIGARETTES IN YOUR ENTIRE LIFE: YES
DO YOU EVER COUGH UP ANY MUCUS OR PHLEGM?: YES, EVERY DAY

## 2020-02-01 ASSESSMENT — PATIENT HEALTH QUESTIONNAIRE - PHQ9
2. FEELING DOWN, DEPRESSED, IRRITABLE, OR HOPELESS: NOT AT ALL
1. LITTLE INTEREST OR PLEASURE IN DOING THINGS: NOT AT ALL
SUM OF ALL RESPONSES TO PHQ9 QUESTIONS 1 AND 2: 0

## 2020-02-01 ASSESSMENT — COGNITIVE AND FUNCTIONAL STATUS - GENERAL
DAILY ACTIVITIY SCORE: 24
SUGGESTED CMS G CODE MODIFIER MOBILITY: CH
SUGGESTED CMS G CODE MODIFIER DAILY ACTIVITY: CH
MOBILITY SCORE: 24

## 2020-02-01 NOTE — ED PROVIDER NOTES
"ED Provider Note    Scribed for Eric Kyle M.D. by Julia Roland. 2/1/2020  3:44 PM    Primary care provider: Pcp Pt States None  Means of arrival: Walk-in  History obtained from: Patient  History limited by: None    CHIEF COMPLAINT  Chief Complaint   Patient presents with   • Flu Like Symptoms   Fever, vomiting    HPI  Fortino Song is a 39 y.o. male with a history of HIV who presents to the Emergency Department for evaluation of flu-like symptoms onset two days ago. At bedside, the patient reports productive cough described as green-yellow in color, fever with T-max of 103 °F, vomiting, sore throat, nasuea, fever, dehydration, and generalized body aches, but denies any abdominal pain.     Patient has a history of HIV and is compliant with antiretroviral medication. He notes last CD4 count to be 1200. The patient states he is currently being seen at [facility]. Patient received the influenza vaccination this season.     REVIEW OF SYSTEMS  Pertinent positives include cough, fever, vomiting, sore throat, nasuea, fever, dehydration, and generalized body aches. Pertinent negatives include no abdominal pain.  All other systems reviewed and negative.    PAST MEDICAL HISTORY   has a past medical history of Back pain, Broken tooth, HIV (human immunodeficiency virus infection) ([facility]), Psychiatric disorder, Sepsis ([facility]) (2/1/2020), and Substance abuse ([facility]).    SURGICAL HISTORY   has a past surgical history that includes exploratory laparotomy (N/A, 10/14/2015) and exploratory laparotomy (10/14/2015).    SOCIAL HISTORY  Social History     Tobacco Use   • Smoking status: Current Every Day Smoker     Packs/day: 0.50     Years: 20.00     Pack years: 10.00     Types: Cigarettes   • Smokeless tobacco: Current User   Substance Use Topics   • Alcohol use: No     Comment: occ; quit \"a while ago\"   • Drug use: Not Currently     Types: Oral, Inhaled     Comment: marijuana      Social History     Substance and " "Sexual Activity   Drug Use Not Currently   • Types: Oral, Inhaled    Comment: marijuana       FAMILY HISTORY  No family history on file.    CURRENT MEDICATIONS  Home Medications     Reviewed by Schuyler B Collett, R.N. (Registered Nurse) on 02/01/20 at 1402  Med List Status: <None>   Medication Last Dose Status        Patient Alejandro Taking any Medications                       ALLERGIES  Allergies   Allergen Reactions   • Nkda [No Known Drug Allergy]        PHYSICAL EXAM  VITAL SIGNS: /72   Pulse (!) 104   Temp (!) 39.5 °C (103.1 °F) (Oral)   Resp 18   Ht 1.778 m (5' 10\")   Wt 75 kg (165 lb 5.5 oz)   SpO2 92%   BMI 23.72 kg/m²     Vital signs reviewed.  Constitutional: Ill-appearing.  Vomiting.  Head: Normocephalic.   Mouth/Throat: Oropharynx is clear and dry. Bilateral enlarged tonsils, right greater than left with exudate. No sign of peritonsillar abscess.   Eyes: EOM are normal. Pupils are equal, round, and reactive to light.   Neck: Normal range of motion. Neck supple.   Cardiovascular: Normal rate, regular rhythm and normal heart sounds.    Pulmonary/Chest: Effort normal and breath sounds normal. No wheezes.   Abdominal: Soft. There is no tenderness. There is no rebound and no guarding.   Musculoskeletal: Exhibits no edema.   Lymphadenopathy: Bilateral tender lymph nodes.   Neurological: Patient is alert and oriented to person, place, and time. CNs II - XII intact. DTRs intact. Normal sensation and strength.  Skin: Skin is diaphoretic.   Psychiatric: Patient has a normal mood and affect. Behavior is normal.       LABS  Results for orders placed or performed during the hospital encounter of 02/01/20   LACTIC ACID   Result Value Ref Range    Lactic Acid 1.0 0.5 - 2.0 mmol/L   CBC WITH DIFFERENTIAL   Result Value Ref Range    WBC 21.5 (H) 4.8 - 10.8 K/uL    RBC 5.10 4.70 - 6.10 M/uL    Hemoglobin 15.7 14.0 - 18.0 g/dL    Hematocrit 45.6 42.0 - 52.0 %    MCV 89.4 81.4 - 97.8 fL    MCH 30.8 27.0 - 33.0 " pg    MCHC 34.4 33.7 - 35.3 g/dL    RDW 42.2 35.9 - 50.0 fL    Platelet Count 179 164 - 446 K/uL    MPV 9.6 9.0 - 12.9 fL    Neutrophils-Polys 83.50 (H) 44.00 - 72.00 %    Lymphocytes 10.20 (L) 22.00 - 41.00 %    Monocytes 5.20 0.00 - 13.40 %    Eosinophils 0.00 0.00 - 6.90 %    Basophils 0.40 0.00 - 1.80 %    Immature Granulocytes 0.70 0.00 - 0.90 %    Nucleated RBC 0.00 /100 WBC    Neutrophils (Absolute) 17.97 (H) 1.82 - 7.42 K/uL    Lymphs (Absolute) 2.20 1.00 - 4.80 K/uL    Monos (Absolute) 1.13 (H) 0.00 - 0.85 K/uL    Eos (Absolute) 0.00 0.00 - 0.51 K/uL    Baso (Absolute) 0.08 0.00 - 0.12 K/uL    Immature Granulocytes (abs) 0.16 (H) 0.00 - 0.11 K/uL    NRBC (Absolute) 0.00 K/uL   COMP METABOLIC PANEL   Result Value Ref Range    Sodium 129 (L) 135 - 145 mmol/L    Potassium 4.4 3.6 - 5.5 mmol/L    Chloride 98 96 - 112 mmol/L    Co2 22 20 - 33 mmol/L    Anion Gap 9.0 0.0 - 11.9    Glucose 147 (H) 65 - 99 mg/dL    Bun 16 8 - 22 mg/dL    Creatinine 1.05 0.50 - 1.40 mg/dL    Calcium 10.0 8.5 - 10.5 mg/dL    AST(SGOT) 25 12 - 45 U/L    ALT(SGPT) 35 2 - 50 U/L    Alkaline Phosphatase 70 30 - 99 U/L    Total Bilirubin 0.8 0.1 - 1.5 mg/dL    Albumin 4.5 3.2 - 4.9 g/dL    Total Protein 8.0 6.0 - 8.2 g/dL    Globulin 3.5 1.9 - 3.5 g/dL    A-G Ratio 1.3 g/dL   URINALYSIS   Result Value Ref Range    Color Yellow     Character Clear     Specific Gravity 1.009 <1.035    Ph 5.5 5.0 - 8.0    Glucose Negative Negative mg/dL    Ketones Negative Negative mg/dL    Protein Negative Negative mg/dL    Bilirubin Negative Negative    Urobilinogen, Urine 0.2 Negative    Nitrite Negative Negative    Leukocyte Esterase Negative Negative    Occult Blood Small (A) Negative    Micro Urine Req Microscopic    Group A Strep by PCR   Result Value Ref Range    Group A Strep by PCR DETECTED (A) Not Detected   Influenza A/B By PCR (Adult - Flu Only)   Result Value Ref Range    Influenza virus A RNA Negative Negative    Influenza virus B, PCR  Negative Negative   ESTIMATED GFR   Result Value Ref Range    GFR If African American >60 >60 mL/min/1.73 m 2    GFR If Non African American >60 >60 mL/min/1.73 m 2   URINE MICROSCOPIC (W/UA)   Result Value Ref Range    WBC 0-2 (A) /hpf    RBC 0-2 (A) /hpf    Bacteria Negative None /hpf    Epithelial Cells Negative /hpf    Hyaline Cast 0-2 /lpf   Prothrombin time (INR)   Result Value Ref Range    PT 14.3 12.0 - 14.6 sec    INR 1.09 0.87 - 1.13     All labs reviewed by me.    RADIOLOGY  DX-CHEST-PORTABLE (1 VIEW)   Final Result         1. No acute cardiopulmonary abnormalities are identified.        The radiologist's interpretation of all radiological studies have been reviewed by me.    COURSE & MEDICAL DECISION MAKING  Pertinent Labs & Imaging studies reviewed. (See chart for details) The patient's [facility] and past medical  records were reviewed    3:44 PM - Patient seen and examined at bedside. Patient will be treated with Tylenol tablet 650 mg. The differential diagnoses include but are not limited to: Flu versus sepsis versus strep versus pneumonia.  Patient's strep test came back positive.  Patient's white count was elevated 21,000.  Given his HIV positive status and inability to tolerate anything by mouth patient will be admitted for IV antibiotics.  Patient had a markedly elevated white count.    4:15 PM - Ordered for DX chest, blood culture, urine culture, CMP, CBC with differential, and lactic acid.    4:57 PM - Patient will be treated with Zofran injection 4 mg.     5:11 PM - Patient will be treated with NS infusion 1L, Zofran injection 4 mg, and Rocephin. Ordered for Group A Strep by PCR, influenza A/B by PCR, and urine microscopic.     6:33 PM - Paged Hospitalist.     6:43 PM I discussed the patient's case and the above findings with Dr. Sun (Hospitalist) who agrees to admit the patient.     DISPOSITION:  Patient will be hospitalized by Dr. Sun (Hospitalist) in [facility]  condition.    FINAL IMPRESSION  1. Strep pharyngitis    2. Acute febrile illness    3. Symptomatic HIV infection (HCC)          I, Julia Roland (Aleta), am scribing for, and in the presence of, Eric Kyle M.D..    Electronically signed by: Julia Roland (Aleta), 2/1/2020    IEric M.D. personally performed the services described in this documentation, as scribed by Julia Roland in my presence, and it is both accurate and complete. C.     The note accurately reflects work and decisions made by me.  Eric Kyle M.D.  2/1/2020  8:14 PM

## 2020-02-02 PROBLEM — D72.829 LEUCOCYTOSIS: Status: ACTIVE | Noted: 2020-02-02

## 2020-02-02 LAB
ALBUMIN SERPL BCP-MCNC: 3.8 G/DL (ref 3.2–4.9)
ALBUMIN/GLOB SERPL: 1.3 G/DL
ALP SERPL-CCNC: 64 U/L (ref 30–99)
ALT SERPL-CCNC: 26 U/L (ref 2–50)
ANION GAP SERPL CALC-SCNC: 7 MMOL/L (ref 0–11.9)
AST SERPL-CCNC: 15 U/L (ref 12–45)
BASOPHILS # BLD AUTO: 0.3 % (ref 0–1.8)
BASOPHILS # BLD: 0.05 K/UL (ref 0–0.12)
BILIRUB SERPL-MCNC: 0.4 MG/DL (ref 0.1–1.5)
BUN SERPL-MCNC: 13 MG/DL (ref 8–22)
CALCIUM SERPL-MCNC: 9 MG/DL (ref 8.5–10.5)
CHLORIDE SERPL-SCNC: 106 MMOL/L (ref 96–112)
CO2 SERPL-SCNC: 23 MMOL/L (ref 20–33)
CREAT SERPL-MCNC: 0.83 MG/DL (ref 0.5–1.4)
EOSINOPHIL # BLD AUTO: 0.05 K/UL (ref 0–0.51)
EOSINOPHIL NFR BLD: 0.3 % (ref 0–6.9)
ERYTHROCYTE [DISTWIDTH] IN BLOOD BY AUTOMATED COUNT: 42.8 FL (ref 35.9–50)
EST. AVERAGE GLUCOSE BLD GHB EST-MCNC: 134 MG/DL
GLOBULIN SER CALC-MCNC: 3 G/DL (ref 1.9–3.5)
GLUCOSE SERPL-MCNC: 150 MG/DL (ref 65–99)
HBA1C MFR BLD: 6.3 % (ref 0–5.6)
HCT VFR BLD AUTO: 43.7 % (ref 42–52)
HGB BLD-MCNC: 14.6 G/DL (ref 14–18)
IMM GRANULOCYTES # BLD AUTO: 0.09 K/UL (ref 0–0.11)
IMM GRANULOCYTES NFR BLD AUTO: 0.6 % (ref 0–0.9)
LACTATE BLD-SCNC: 0.8 MMOL/L (ref 0.5–2)
LACTATE BLD-SCNC: 1.1 MMOL/L (ref 0.5–2)
LYMPHOCYTES # BLD AUTO: 2.85 K/UL (ref 1–4.8)
LYMPHOCYTES NFR BLD: 18.3 % (ref 22–41)
MCH RBC QN AUTO: 30.1 PG (ref 27–33)
MCHC RBC AUTO-ENTMCNC: 33.4 G/DL (ref 33.7–35.3)
MCV RBC AUTO: 90.1 FL (ref 81.4–97.8)
MONOCYTES # BLD AUTO: 0.95 K/UL (ref 0–0.85)
MONOCYTES NFR BLD AUTO: 6.1 % (ref 0–13.4)
NEUTROPHILS # BLD AUTO: 11.58 K/UL (ref 1.82–7.42)
NEUTROPHILS NFR BLD: 74.4 % (ref 44–72)
NRBC # BLD AUTO: 0 K/UL
NRBC BLD-RTO: 0 /100 WBC
PLATELET # BLD AUTO: 148 K/UL (ref 164–446)
PMV BLD AUTO: 9 FL (ref 9–12.9)
POTASSIUM SERPL-SCNC: 4.1 MMOL/L (ref 3.6–5.5)
PROT SERPL-MCNC: 6.8 G/DL (ref 6–8.2)
RBC # BLD AUTO: 4.85 M/UL (ref 4.7–6.1)
SODIUM SERPL-SCNC: 136 MMOL/L (ref 135–145)
WBC # BLD AUTO: 15.6 K/UL (ref 4.8–10.8)

## 2020-02-02 PROCEDURE — 700102 HCHG RX REV CODE 250 W/ 637 OVERRIDE(OP): Performed by: INTERNAL MEDICINE

## 2020-02-02 PROCEDURE — 87040 BLOOD CULTURE FOR BACTERIA: CPT

## 2020-02-02 PROCEDURE — 80053 COMPREHEN METABOLIC PANEL: CPT

## 2020-02-02 PROCEDURE — 770021 HCHG ROOM/CARE - ISO PRIVATE

## 2020-02-02 PROCEDURE — 700105 HCHG RX REV CODE 258: Performed by: INTERNAL MEDICINE

## 2020-02-02 PROCEDURE — 85025 COMPLETE CBC W/AUTO DIFF WBC: CPT

## 2020-02-02 PROCEDURE — 700101 HCHG RX REV CODE 250: Performed by: INTERNAL MEDICINE

## 2020-02-02 PROCEDURE — 700111 HCHG RX REV CODE 636 W/ 250 OVERRIDE (IP): Performed by: INTERNAL MEDICINE

## 2020-02-02 PROCEDURE — 99233 SBSQ HOSP IP/OBS HIGH 50: CPT | Performed by: INTERNAL MEDICINE

## 2020-02-02 PROCEDURE — 83605 ASSAY OF LACTIC ACID: CPT

## 2020-02-02 PROCEDURE — A9270 NON-COVERED ITEM OR SERVICE: HCPCS | Performed by: INTERNAL MEDICINE

## 2020-02-02 PROCEDURE — 36415 COLL VENOUS BLD VENIPUNCTURE: CPT

## 2020-02-02 RX ORDER — KETOROLAC TROMETHAMINE 30 MG/ML
30 INJECTION, SOLUTION INTRAMUSCULAR; INTRAVENOUS EVERY 6 HOURS PRN
Status: DISCONTINUED | OUTPATIENT
Start: 2020-02-02 | End: 2020-02-04 | Stop reason: HOSPADM

## 2020-02-02 RX ORDER — LIDOCAINE HYDROCHLORIDE 20 MG/ML
15 SOLUTION OROPHARYNGEAL
Status: DISCONTINUED | OUTPATIENT
Start: 2020-02-02 | End: 2020-02-04 | Stop reason: HOSPADM

## 2020-02-02 RX ADMIN — ENOXAPARIN SODIUM 40 MG: 100 INJECTION SUBCUTANEOUS at 05:17

## 2020-02-02 RX ADMIN — SODIUM CHLORIDE: 9 INJECTION, SOLUTION INTRAVENOUS at 05:16

## 2020-02-02 RX ADMIN — SODIUM CHLORIDE: 9 INJECTION, SOLUTION INTRAVENOUS at 13:00

## 2020-02-02 RX ADMIN — SENNOSIDES AND DOCUSATE SODIUM 2 TABLET: 8.6; 5 TABLET ORAL at 18:28

## 2020-02-02 RX ADMIN — OXYCODONE HYDROCHLORIDE 5 MG: 5 TABLET ORAL at 18:41

## 2020-02-02 RX ADMIN — RALTEGRAVIR 400 MG: 400 TABLET, FILM COATED ORAL at 05:12

## 2020-02-02 RX ADMIN — LIDOCAINE HYDROCHLORIDE 15 ML: 20 SOLUTION ORAL; TOPICAL at 14:57

## 2020-02-02 RX ADMIN — OXYCODONE HYDROCHLORIDE 5 MG: 5 TABLET ORAL at 21:46

## 2020-02-02 RX ADMIN — LORAZEPAM 0.5 MG: 0.5 TABLET ORAL at 22:32

## 2020-02-02 RX ADMIN — SODIUM CHLORIDE: 9 INJECTION, SOLUTION INTRAVENOUS at 21:00

## 2020-02-02 RX ADMIN — CEFTRIAXONE SODIUM 2 G: 2 INJECTION, POWDER, FOR SOLUTION INTRAMUSCULAR; INTRAVENOUS at 18:33

## 2020-02-02 RX ADMIN — LAMOTRIGINE 50 MG: 100 TABLET ORAL at 05:12

## 2020-02-02 RX ADMIN — LAMOTRIGINE 50 MG: 100 TABLET ORAL at 18:29

## 2020-02-02 RX ADMIN — OXYCODONE HYDROCHLORIDE 5 MG: 5 TABLET ORAL at 15:02

## 2020-02-02 RX ADMIN — OXYCODONE HYDROCHLORIDE 5 MG: 5 TABLET ORAL at 10:27

## 2020-02-02 RX ADMIN — RALTEGRAVIR 400 MG: 400 TABLET, FILM COATED ORAL at 18:30

## 2020-02-02 RX ADMIN — EMTRICITABINE AND TENOFOVIR DISOPROXIL FUMARATE 1 TABLET: 200; 300 TABLET, FILM COATED ORAL at 05:13

## 2020-02-02 RX ADMIN — NICOTINE 14 MG: 14 PATCH TRANSDERMAL at 05:13

## 2020-02-02 ASSESSMENT — ENCOUNTER SYMPTOMS
DIZZINESS: 0
FOCAL WEAKNESS: 0
NERVOUS/ANXIOUS: 0
BACK PAIN: 0
EYE DISCHARGE: 0
WEIGHT LOSS: 0
NECK PAIN: 0
BLURRED VISION: 0
EYE REDNESS: 0
NAUSEA: 0
INSOMNIA: 0
ORTHOPNEA: 0
FEVER: 1
HEADACHES: 0
VOMITING: 0
DIARRHEA: 0
HEARTBURN: 0
DEPRESSION: 0
SORE THROAT: 1
SHORTNESS OF BREATH: 0
CHILLS: 0
ABDOMINAL PAIN: 0
PALPITATIONS: 0
SEIZURES: 0
COUGH: 0
MYALGIAS: 0
EYE PAIN: 0
STRIDOR: 0
SPUTUM PRODUCTION: 0

## 2020-02-02 NOTE — ED NOTES
Medication reconciliation has been completed by interviewing patient with consent to do so with family/visitors in the room.   Allergies were reviewed   No ORAL antibiotics within the past 14 days  Pt home pharmacy:[facility]

## 2020-02-02 NOTE — ED NOTES
Pt resting comfortably in room with family at bedside. Temp recheck 101.3. extra blankets removed from pt at this time and pt educated.

## 2020-02-02 NOTE — PROGRESS NOTES
Received report from night shift RN and assumed care of patient. Patient awake during bedside report. Patient is A&O x 4 and reports 6/10 pain in his throat, head and joints. Medicated per MAR. Patient showered and watching movies in room with his wife at bedside. Patient has no other concerns at this time.  Bed is in lowest, locked position, call light is within reach and patient calls for assistance appropriately. All other needs met.

## 2020-02-02 NOTE — PROGRESS NOTES
Received report from ED RN, patient arrived to floor with transport. Ambulated to room with steady gait. Patient is A&Ox4, up self with no assistance. Wife at bedside. Patient medicated per MAR. Temp 102.2 (oral) upon arriving, medicated per MAR with good response. Other VSS. Patient c/p generalized pain, especially in joints. Medicated per MAR. Home meds were not continued, called MD - orders received. PIV in RFA infusing. Patient on droplet precautions for Strep throat. All needs met at this time.

## 2020-02-02 NOTE — ASSESSMENT & PLAN NOTE
This is Sepsis Present on admission  SIRS criteria identified on my evaluation include: Fever, with temperature greater than 101 deg F, Tachycardia, with heart rate greater than 90 BPM and Leukocyosis, with WBC greater than 12,000  Source is upper respiratory infection, strep pharyngitis  Sepsis protocol initiated  Fluid resuscitation ordered per protocol  IV antibiotics as appropriate for source of sepsis  While organ dysfunction may be noted elsewhere in this problem list or in the chart, degree of organ dysfunction does not meet CMS criteria for severe sepsis  Continue IV ceftriaxone

## 2020-02-02 NOTE — ASSESSMENT & PLAN NOTE
Started empirically on ceftriaxone  Improving  IV fluid support  Full liquid diet  Tylenol for fever

## 2020-02-02 NOTE — CARE PLAN
Problem: Knowledge Deficit  Goal: Knowledge of the prescribed therapeutic regimen will improve  Outcome: PROGRESSING AS EXPECTED  Intervention: Discuss information regarding therpeutic regimen and document in education  Note:   Discussed POC answered all questions.      Problem: Safety  Goal: Will remain free from falls  Outcome: PROGRESSING AS EXPECTED  Intervention: Assess risk factors for falls  Note:   Low fall risk, bed alarm not indicated. Patient educated on calling for assistance if feeling weak or dizzy.

## 2020-02-02 NOTE — ED NOTES
Unable to obtain second set of cultures, OSBALDO Gutierrez made aware. Pt being transferred to floor.

## 2020-02-02 NOTE — PROGRESS NOTES
2 RN Skin Check    2 RN skin check complete Cynthia ROBLERO.   Devices in place: None.  Skin assessed under devices: N\A.  Confirmed pressure ulcers found on: N/A.  New potential pressure ulcers noted on N/A. Wound consult placed N/A.  The following interventions in place Pillows.  Patient has no evidence of skin breakdown. Old scars on left forearm. Small scratch mid back.

## 2020-02-02 NOTE — PROGRESS NOTES
Hospital Medicine Daily Progress Note    Date of Service  2/2/2020    Chief Complaint  39 y.o. male admitted 2/1/2020 with sore throat    Hospital Course    39 years old male admitted with sore throat and found to have acute pharyngitis from strep infection      Interval Problem Update  Still complaining about sore throat, cervical lymphadenopathy and headache.  I reviewed left with WBC 15.6, hemoglobin 14.6, sodium 136, potassium 4.1, glucose 150. Patient was seen and examined by me today. Case was discussed with RN and multidisplinary team during rounds. Denies nausea, vomiting, diarrhea.       Consultants/Specialty  none    Code Status  full    Disposition  Remain on the floor    Review of Systems  Review of Systems   Constitutional: Positive for fever. Negative for chills and weight loss.   HENT: Positive for sore throat. Negative for congestion and nosebleeds.    Eyes: Negative for blurred vision, pain, discharge and redness.   Respiratory: Negative for cough, sputum production, shortness of breath and stridor.    Cardiovascular: Negative for chest pain, palpitations and orthopnea.   Gastrointestinal: Negative for abdominal pain, diarrhea, heartburn, nausea and vomiting.   Genitourinary: Negative for dysuria, frequency and urgency.   Musculoskeletal: Negative for back pain, myalgias and neck pain.   Skin: Negative for itching and rash.   Neurological: Negative for dizziness, focal weakness, seizures and headaches.   Psychiatric/Behavioral: Negative for depression. The patient is not nervous/anxious and does not have insomnia.         Physical Exam  Temp:  [37.3 °C (99.2 °F)-39.5 °C (103.1 °F)] 37.3 °C (99.2 °F)  Pulse:  [] 83  Resp:  [18] 18  BP: ()/(49-72) 97/50  SpO2:  [92 %-97 %] 95 %    Physical Exam  Vitals signs reviewed.   Constitutional:       General: He is not in acute distress.     Appearance: Normal appearance.   HENT:      Head: Normocephalic and atraumatic.      Comments: Painful  cervical lymphadenopathy     Nose: No congestion or rhinorrhea.   Eyes:      Extraocular Movements: Extraocular movements intact.      Pupils: Pupils are equal, round, and reactive to light.   Neck:      Musculoskeletal: Normal range of motion and neck supple.   Cardiovascular:      Rate and Rhythm: Normal rate and regular rhythm.      Pulses: Normal pulses.   Pulmonary:      Effort: Pulmonary effort is normal. No respiratory distress.      Breath sounds: Normal breath sounds.   Abdominal:      General: Bowel sounds are normal. There is no distension.      Palpations: Abdomen is soft.      Tenderness: There is no tenderness.   Musculoskeletal:         General: No swelling or tenderness.   Skin:     General: Skin is warm.      Findings: No erythema.   Neurological:      General: No focal deficit present.      Mental Status: He is alert and oriented to person, place, and time.         Fluids    Intake/Output Summary (Last 24 hours) at 2/2/2020 0738  Last data filed at 2/1/2020 1907  Gross per 24 hour   Intake 100 ml   Output --   Net 100 ml       Laboratory  Recent Labs     02/01/20  1509 02/02/20  0310   WBC 21.5* 15.6*   RBC 5.10 4.85   HEMOGLOBIN 15.7 14.6   HEMATOCRIT 45.6 43.7   MCV 89.4 90.1   MCH 30.8 30.1   MCHC 34.4 33.4*   RDW 42.2 42.8   PLATELETCT 179 148*   MPV 9.6 9.0     Recent Labs     02/01/20  1509 02/02/20  0310   SODIUM 129* 136   POTASSIUM 4.4 4.1   CHLORIDE 98 106   CO2 22 23   GLUCOSE 147* 150*   BUN 16 13   CREATININE 1.05 0.83   CALCIUM 10.0 9.0     Recent Labs     02/01/20  1509   INR 1.09               Imaging  DX-CHEST-PORTABLE (1 VIEW)   Final Result         1. No acute cardiopulmonary abnormalities are identified.           Assessment/Plan  Strep pharyngitis  Assessment & Plan  Started empirically on ceftriaxone  IV fluid support  Full liquid diet  Tylenol for fever  Oxycodone for pain    Sepsis (HCC)  Assessment & Plan  This is Sepsis Present on admission  SIRS criteria identified on my  evaluation include: Fever, with temperature greater than 101 deg F, Tachycardia, with heart rate greater than 90 BPM and Leukocyosis, with WBC greater than 12,000  Source is upper respiratory infection, strep pharyngitis  Sepsis protocol initiated  Fluid resuscitation ordered per protocol  IV antibiotics as appropriate for source of sepsis  While organ dysfunction may be noted elsewhere in this problem list or in the chart, degree of organ dysfunction does not meet CMS criteria for severe sepsis          Leucocytosis  Assessment & Plan  Related to pharyngitis  WBC is improving  Follow CBC in the morning    Bipolar disorder (HCC)  Assessment & Plan  Continue outpatient medications  Mood is stable.  Denies suicidal homicidal ideations    Smoking  Assessment & Plan  Spent approx 5 mins on Tobacco cessation education . Discussed options of nicotine patch, medical treatment with wellbutrin and chantix. Discussed the benefits of quitting smoking and risks of continued smoking including cardiovascular disease, cancer and COPD.   Code 29207      Hyponatremia  Assessment & Plan  Na is improving  We will start IV normal saline.  Monitor sodium in am    Hyperglycemia  Assessment & Plan  He denies history of diabetes.  Likely stress related  We will check A1c.    HIV (human immunodeficiency virus infection) ([facility])  Assessment & Plan  Continue antiretroviral regimen.  Patient reportedly has been compliant on last CD4 count at [facility] a few days ago 1200 (?)  Follow with [facility]       VTE prophylaxis: ambulatory

## 2020-02-02 NOTE — ASSESSMENT & PLAN NOTE
Continue antiretroviral regimen.  Patient reportedly has been compliant on last CD4 count at [facility] a few days ago 1200 (?)  Follow with [facility]

## 2020-02-02 NOTE — H&P
Hospital Medicine History & Physical Note    Date of Service  2/1/2020    Primary Care Physician  Pcp Pt States None    Consultants  None    Code Status  Full code    Chief Complaint  Chills, fever, sore throat, cough with yellow sputum, nausea, vomiting    History of Presenting Illness  39 y.o. male with history of HIV, compliant with antiretroviral medications, who presented 2/1/2020 with complaints of chills, fever, sore throat, cough with yellow to green sputum.  He feels sick for 2 days.  He denies sick contacts or recent travel.  Review of systems positive for fever with T-max 103 at home, nausea vomiting 3 times, one-time loose bowel movement today, generalized body aches, frontal headache, dull, 7 out of 10.  He denies dysuria, abdominal pain  According to him he has been compliant with his antiretroviral medications.  He last time visited [facility] clinic on 1/29/2020 and reportedly CD4 count was 1200 at that time.    Review of Systems  Review of Systems   Constitutional: Negative for chills, fever and weight loss.   HENT: Positive for congestion and sore throat. Negative for ear pain, hearing loss and tinnitus.    Eyes: Negative for blurred vision, double vision and photophobia.   Respiratory: Positive for cough and sputum production. Negative for hemoptysis and shortness of breath.    Cardiovascular: Negative for chest pain, palpitations and orthopnea.   Gastrointestinal: Positive for diarrhea, nausea and vomiting. Negative for heartburn.   Genitourinary: Negative for dysuria, flank pain, frequency and hematuria.   Musculoskeletal: Negative for back pain, joint pain and neck pain.   Skin: Negative for itching and rash.   Neurological: Negative for tremors, speech change, focal weakness and headaches.   Endo/Heme/Allergies: Negative for environmental allergies and polydipsia. Does not bruise/bleed easily.   Psychiatric/Behavioral: Negative for hallucinations and substance abuse. The patient is not  nervous/anxious.        Past Medical History   has a past medical history of Back pain, Broken tooth, HIV (human immunodeficiency virus infection) (HCC), Psychiatric disorder, and Substance abuse (HCC). He also has no past medical history of Anginal syndrome (HCC), Arrhythmia, Arthritis, Asthma, Blood clotting disorder (HCC), Bronchitis, Cancer (HCC), Cataract, Congestive heart failure (HCC), Diabetes (HCC), Disorder of thyroid, Encounter for renal dialysis, Fall, Glaucoma, Heart murmur, Heart valve disease, Hemorrhagic disorder (HCC), Hypertension, Indigestion, Infectious disease, Jaundice, Myocardial infarct (HCC), Pacemaker, Pneumonia, Renal disorder, Rheumatic fever, Seizure (HCC), Stroke (HCC), or Urinary incontinence.    Surgical History   has a past surgical history that includes exploratory laparotomy (N/A, 10/14/2015) and exploratory laparotomy (10/14/2015).     Family History  Denies family history of heart disease    Social History  He reports that he smokes half pack a day.  He has a 10.00 pack-year smoking history. He uses smokeless tobacco. He reports previous drug use. Drugs: Oral and Inhaled. He reports that he does not drink alcohol.    Allergies  Allergies   Allergen Reactions   • Nkda [No Known Drug Allergy]        Medications  None       Physical Exam  Temp:  [39.5 °C (103.1 °F)] 39.5 °C (103.1 °F)  Pulse:  [] 86  Resp:  [18] 18  BP: (109-120)/(57-72) 111/57  SpO2:  [92 %-95 %] 95 %    Physical Exam  Vitals signs and nursing note reviewed.   Constitutional:       General: He is not in acute distress.     Appearance: Normal appearance.   HENT:      Head: Normocephalic and atraumatic.      Nose: Nose normal.      Mouth/Throat:      Mouth: Mucous membranes are moist.      Pharynx: Pharyngeal swelling and posterior oropharyngeal erythema present.      Tonsils: Tonsillar exudate present. No tonsillar abscesses.   Eyes:      Extraocular Movements: Extraocular movements intact.      Pupils: Pupils  are equal, round, and reactive to light.   Neck:      Musculoskeletal: Normal range of motion and neck supple.   Cardiovascular:      Rate and Rhythm: Normal rate and regular rhythm.   Pulmonary:      Effort: Pulmonary effort is normal.      Breath sounds: Normal breath sounds.   Abdominal:      General: Abdomen is flat. There is no distension.      Tenderness: There is no tenderness.   Musculoskeletal: Normal range of motion.         General: No swelling or deformity.   Skin:     General: Skin is warm and dry.   Neurological:      General: No focal deficit present.      Mental Status: He is alert and oriented to person, place, and time.   Psychiatric:         Mood and Affect: Mood normal.         Behavior: Behavior normal.         Laboratory:  Recent Labs     02/01/20  1509   WBC 21.5*   RBC 5.10   HEMOGLOBIN 15.7   HEMATOCRIT 45.6   MCV 89.4   MCH 30.8   MCHC 34.4   RDW 42.2   PLATELETCT 179   MPV 9.6     Recent Labs     02/01/20  1509   SODIUM 129*   POTASSIUM 4.4   CHLORIDE 98   CO2 22   GLUCOSE 147*   BUN 16   CREATININE 1.05   CALCIUM 10.0     Recent Labs     02/01/20  1509   ALTSGPT 35   ASTSGOT 25   ALKPHOSPHAT 70   TBILIRUBIN 0.8   GLUCOSE 147*         No results for input(s): NTPROBNP in the last 72 hours.      No results for input(s): TROPONINT in the last 72 hours.    Urinalysis:    Recent Labs     02/01/20  1710   SPECGRAVITY 1.009   GLUCOSEUR Negative   KETONES Negative   NITRITE Negative   LEUKESTERAS Negative   WBCURINE 0-2*   RBCURINE 0-2*   BACTERIA Negative   EPITHELCELL Negative        Imaging:  DX-CHEST-PORTABLE (1 VIEW)   Final Result         1. No acute cardiopulmonary abnormalities are identified.            Assessment/Plan:  I anticipate this patient will require at least two midnights for appropriate medical management, necessitating inpatient admission.    Strep pharyngitis  Assessment & Plan  Started empirically on ceftriaxone  IV fluid support  Full liquid diet  Tylenol for  fever  Oxycodone for pain    Sepsis ([facility])  Assessment & Plan  This is Sepsis Present on admission  SIRS criteria identified on my evaluation include: Fever, with temperature greater than 101 deg F, Tachycardia, with heart rate greater than 90 BPM and Leukocyosis, with WBC greater than 12,000  Source is upper respiratory infection, strep pharyngitis  Sepsis protocol initiated  Fluid resuscitation ordered per protocol  IV antibiotics as appropriate for source of sepsis  While organ dysfunction may be noted elsewhere in this problem list or in the chart, degree of organ dysfunction does not meet CMS criteria for severe sepsis          Bipolar disorder ([facility])  Assessment & Plan  Continue outpatient medications  Mood is stable.  Denies suicidal homicidal ideations    Smoking  Assessment & Plan  Spent approx 5 mins on Tobacco cessation education . Discussed options of nicotine patch, medical treatment with wellbutrin and chantix. Discussed the benefits of quitting smoking and risks of continued smoking including cardiovascular disease, cancer and COPD.   Code 88500      Hyponatremia  Assessment & Plan  Probably secondary to dehydration.  We will start IV normal saline.  Monitor sodium    Hyperglycemia  Assessment & Plan  He denies history of diabetes.  We will check A1c.    HIV (human immunodeficiency virus infection) ([facility])  Assessment & Plan  Continue antiretroviral regimen.  Patient reportedly has been compliant on last CD4 count at [facility] a few days ago 1200 (?)  Follow with [facility]      VTE prophylaxis: Heparin

## 2020-02-02 NOTE — CARE PLAN
Problem: Infection  Goal: Will remain free from infection  Outcome: PROGRESSING AS EXPECTED  Note:   Patient taking prescribed antibiotics for infection. Educated patient on wearing mask when walking in hallway to prevent spread of infection.      Problem: Pain Management  Goal: Pain level will decrease to patient's comfort goal  Outcome: PROGRESSING AS EXPECTED  Flowsheets (Taken 2/2/2020 1223)  Comfort Goal: 2  Pain Rating Scale (NPRS): 6  Note:   Medicated per MAR

## 2020-02-02 NOTE — ASSESSMENT & PLAN NOTE
Spent approx 5 mins on Tobacco cessation education . Discussed options of nicotine patch, medical treatment with wellbutrin and chantix. Discussed the benefits of quitting smoking and risks of continued smoking including cardiovascular disease, cancer and COPD.   Code 78483

## 2020-02-03 LAB
ALBUMIN SERPL BCP-MCNC: 3.8 G/DL (ref 3.2–4.9)
ALBUMIN/GLOB SERPL: 1.2 G/DL
ALP SERPL-CCNC: 69 U/L (ref 30–99)
ALT SERPL-CCNC: 27 U/L (ref 2–50)
ANION GAP SERPL CALC-SCNC: 6 MMOL/L (ref 0–11.9)
AST SERPL-CCNC: 16 U/L (ref 12–45)
BACTERIA UR CULT: NORMAL
BASOPHILS # BLD AUTO: 0.4 % (ref 0–1.8)
BASOPHILS # BLD: 0.05 K/UL (ref 0–0.12)
BILIRUB SERPL-MCNC: 0.3 MG/DL (ref 0.1–1.5)
BUN SERPL-MCNC: 8 MG/DL (ref 8–22)
CALCIUM SERPL-MCNC: 9.2 MG/DL (ref 8.5–10.5)
CHLORIDE SERPL-SCNC: 103 MMOL/L (ref 96–112)
CO2 SERPL-SCNC: 26 MMOL/L (ref 20–33)
CREAT SERPL-MCNC: 0.84 MG/DL (ref 0.5–1.4)
EOSINOPHIL # BLD AUTO: 0.18 K/UL (ref 0–0.51)
EOSINOPHIL NFR BLD: 1.4 % (ref 0–6.9)
ERYTHROCYTE [DISTWIDTH] IN BLOOD BY AUTOMATED COUNT: 43.8 FL (ref 35.9–50)
GLOBULIN SER CALC-MCNC: 3.1 G/DL (ref 1.9–3.5)
GLUCOSE SERPL-MCNC: 104 MG/DL (ref 65–99)
HCT VFR BLD AUTO: 42.9 % (ref 42–52)
HGB BLD-MCNC: 14.1 G/DL (ref 14–18)
IMM GRANULOCYTES # BLD AUTO: 0.06 K/UL (ref 0–0.11)
IMM GRANULOCYTES NFR BLD AUTO: 0.5 % (ref 0–0.9)
LYMPHOCYTES # BLD AUTO: 3.54 K/UL (ref 1–4.8)
LYMPHOCYTES NFR BLD: 28.4 % (ref 22–41)
MCH RBC QN AUTO: 30.5 PG (ref 27–33)
MCHC RBC AUTO-ENTMCNC: 32.9 G/DL (ref 33.7–35.3)
MCV RBC AUTO: 92.9 FL (ref 81.4–97.8)
MONOCYTES # BLD AUTO: 0.99 K/UL (ref 0–0.85)
MONOCYTES NFR BLD AUTO: 7.9 % (ref 0–13.4)
NEUTROPHILS # BLD AUTO: 7.64 K/UL (ref 1.82–7.42)
NEUTROPHILS NFR BLD: 61.4 % (ref 44–72)
NRBC # BLD AUTO: 0 K/UL
NRBC BLD-RTO: 0 /100 WBC
PLATELET # BLD AUTO: 136 K/UL (ref 164–446)
PMV BLD AUTO: 9.5 FL (ref 9–12.9)
POTASSIUM SERPL-SCNC: 4.1 MMOL/L (ref 3.6–5.5)
PROT SERPL-MCNC: 6.9 G/DL (ref 6–8.2)
RBC # BLD AUTO: 4.62 M/UL (ref 4.7–6.1)
SIGNIFICANT IND 70042: NORMAL
SITE SITE: NORMAL
SODIUM SERPL-SCNC: 135 MMOL/L (ref 135–145)
SOURCE SOURCE: NORMAL
WBC # BLD AUTO: 12.5 K/UL (ref 4.8–10.8)

## 2020-02-03 PROCEDURE — 85025 COMPLETE CBC W/AUTO DIFF WBC: CPT

## 2020-02-03 PROCEDURE — 770021 HCHG ROOM/CARE - ISO PRIVATE

## 2020-02-03 PROCEDURE — 700102 HCHG RX REV CODE 250 W/ 637 OVERRIDE(OP): Performed by: INTERNAL MEDICINE

## 2020-02-03 PROCEDURE — A9270 NON-COVERED ITEM OR SERVICE: HCPCS | Performed by: INTERNAL MEDICINE

## 2020-02-03 PROCEDURE — 700111 HCHG RX REV CODE 636 W/ 250 OVERRIDE (IP): Performed by: INTERNAL MEDICINE

## 2020-02-03 PROCEDURE — 99233 SBSQ HOSP IP/OBS HIGH 50: CPT | Performed by: INTERNAL MEDICINE

## 2020-02-03 PROCEDURE — 36415 COLL VENOUS BLD VENIPUNCTURE: CPT

## 2020-02-03 PROCEDURE — 80053 COMPREHEN METABOLIC PANEL: CPT

## 2020-02-03 PROCEDURE — 700105 HCHG RX REV CODE 258: Performed by: INTERNAL MEDICINE

## 2020-02-03 RX ADMIN — LORAZEPAM 0.5 MG: 0.5 TABLET ORAL at 22:13

## 2020-02-03 RX ADMIN — LAMOTRIGINE 50 MG: 100 TABLET ORAL at 04:52

## 2020-02-03 RX ADMIN — OXYCODONE HYDROCHLORIDE 5 MG: 5 TABLET ORAL at 05:01

## 2020-02-03 RX ADMIN — OXYCODONE HYDROCHLORIDE 10 MG: 10 TABLET ORAL at 22:09

## 2020-02-03 RX ADMIN — NICOTINE 14 MG: 14 PATCH TRANSDERMAL at 04:54

## 2020-02-03 RX ADMIN — EMTRICITABINE AND TENOFOVIR DISOPROXIL FUMARATE 1 TABLET: 200; 300 TABLET, FILM COATED ORAL at 04:52

## 2020-02-03 RX ADMIN — OXYCODONE HYDROCHLORIDE 5 MG: 5 TABLET ORAL at 17:40

## 2020-02-03 RX ADMIN — RALTEGRAVIR 400 MG: 400 TABLET, FILM COATED ORAL at 17:26

## 2020-02-03 RX ADMIN — ENOXAPARIN SODIUM 40 MG: 100 INJECTION SUBCUTANEOUS at 04:53

## 2020-02-03 RX ADMIN — SENNOSIDES AND DOCUSATE SODIUM 2 TABLET: 8.6; 5 TABLET ORAL at 04:53

## 2020-02-03 RX ADMIN — RALTEGRAVIR 400 MG: 400 TABLET, FILM COATED ORAL at 04:52

## 2020-02-03 RX ADMIN — CEFTRIAXONE SODIUM 2 G: 2 INJECTION, POWDER, FOR SOLUTION INTRAMUSCULAR; INTRAVENOUS at 17:26

## 2020-02-03 RX ADMIN — SODIUM CHLORIDE: 9 INJECTION, SOLUTION INTRAVENOUS at 04:58

## 2020-02-03 RX ADMIN — LAMOTRIGINE 50 MG: 100 TABLET ORAL at 17:26

## 2020-02-03 ASSESSMENT — ENCOUNTER SYMPTOMS
DIARRHEA: 0
ABDOMINAL PAIN: 0
SHORTNESS OF BREATH: 0
EYE PAIN: 0
NECK PAIN: 0
MYALGIAS: 0
INSOMNIA: 0
CHILLS: 0
SEIZURES: 0
VOMITING: 0
ORTHOPNEA: 0
FEVER: 1
NAUSEA: 0
STRIDOR: 0
SPUTUM PRODUCTION: 0
DEPRESSION: 0
EYE DISCHARGE: 0
BACK PAIN: 0
EYE REDNESS: 0
HEADACHES: 0
FOCAL WEAKNESS: 0
PALPITATIONS: 0
SORE THROAT: 1
NERVOUS/ANXIOUS: 0

## 2020-02-03 NOTE — DISCHARGE PLANNING
Patient is eligible for Medicaid Meds to Beds at discharge if they have coverage with [place] Medicaid, Medicaid FFS, Medicaid HMO ([facility]), or [place]. This service is provided through the [facility] if orders are received by the pharmacy prior to 4pm Monday through Friday excluding holidays. Preferred pharmacy has been changed to [facility]. Please call [phone] prior to discharge.

## 2020-02-03 NOTE — CARE PLAN
Problem: Safety  Goal: Will remain free from falls  Outcome: PROGRESSING AS EXPECTED     Problem: Pain Management  Goal: Pain level will decrease to patient's comfort goal  Outcome: PROGRESSING AS EXPECTED  Note:   Medication per MAR.

## 2020-02-03 NOTE — CARE PLAN
Problem: Communication  Goal: The ability to communicate needs accurately and effectively will improve  Outcome: PROGRESSING AS EXPECTED  Intervention: Educate patient and significant other/support system about the plan of care, procedures, treatments, medications and allow for questions  Flowsheets (Taken 2/3/2020 0958)  Pt & Family Have Been Educated on Methods Available to Report Concerns Related to Care, Treatment, Services, and Patient Safety Issues: Yes     Problem: Safety  Goal: Will remain free from injury  Outcome: PROGRESSING AS EXPECTED  Intervention: Provide assistance with mobility  Flowsheets (Taken 2/3/2020 0900 by Luek Luong)  Assistance: Assistance of One  Ambulation Tolerance: Tolerates Well  Goal: Will remain free from falls  Outcome: PROGRESSING AS EXPECTED     Problem: Infection  Goal: Will remain free from infection  Outcome: PROGRESSING AS EXPECTED

## 2020-02-03 NOTE — PROGRESS NOTES
Pt was sitting in bed with significant other at bedside at change of shift.. Pt is AOx 4, mobile with no assist, and on room air.  Pt tolerating full liquid diet.  PIV in place running NS at 150 mL/hr.  At assessment pt reporting 7/10 pain in head and throat. Pt medicated per MAR. Vital signs stable. Reviewed plan of care. Pt aksing about advancing diet. Will pass on to am RN that pt has questions on diet for hospitalist. Pt belongings and call light in reach, bed locked and in lowest position, and pt now resting quietly. No other needs at this time.

## 2020-02-03 NOTE — PROGRESS NOTES
Pt is A&Ox4. Pt is resting in bed, no signs of labored breathing or pain. Pt on RA. Call light & personal belongings within reach, bed in lowest position & locked. Pt up self, gait is steady. Pt updated on plan of care for the day. Pt declines any additional needs at this time. Will continue to monitor.

## 2020-02-03 NOTE — PROGRESS NOTES
Hospital Medicine Daily Progress Note    Date of Service  2/3/2020    Chief Complaint  39 y.o. male admitted 2/1/2020 with sore throat    Hospital Course    39 years old male admitted with sore throat and found to have acute pharyngitis from strep infection      Interval Problem Update  His sore throat is improving.  Still having productive cough with yellowish sputum.  Updated him about his blood tests including WBC 12.5, hemoglobin 14.1, BUN 8, creatinine 0.84.  No fever overnight vital has been stable.  Patient was seen and examined by me today. Case was discussed with RN and multidisplinary team during rounds. Denies nausea, vomiting, diarrhea.       Consultants/Specialty  none    Code Status  full    Disposition  Remain on the floor    Review of Systems  Review of Systems   Constitutional: Positive for fever. Negative for chills.   HENT: Positive for sore throat. Negative for congestion and nosebleeds.    Eyes: Negative for pain, discharge and redness.   Respiratory: Negative for sputum production, shortness of breath and stridor.    Cardiovascular: Negative for palpitations and orthopnea.   Gastrointestinal: Negative for abdominal pain, diarrhea, nausea and vomiting.   Genitourinary: Negative for frequency and urgency.   Musculoskeletal: Negative for back pain, myalgias and neck pain.   Skin: Negative for itching and rash.   Neurological: Negative for focal weakness, seizures and headaches.   Psychiatric/Behavioral: Negative for depression. The patient is not nervous/anxious and does not have insomnia.         Physical Exam  Temp:  [36.3 °C (97.3 °F)-37.1 °C (98.8 °F)] 36.3 °C (97.3 °F)  Pulse:  [69-85] 69  Resp:  [16-18] 18  BP: (110-124)/(57-76) 113/67  SpO2:  [95 %-97 %] 97 %    Physical Exam  Vitals signs reviewed.   Constitutional:       General: He is not in acute distress.     Appearance: Normal appearance.   HENT:      Head: Normocephalic.      Comments: Painful cervical lymphadenopathy     Nose: No  congestion or rhinorrhea.   Eyes:      Extraocular Movements: Extraocular movements intact.      Pupils: Pupils are equal, round, and reactive to light.   Neck:      Musculoskeletal: Normal range of motion and neck supple.   Cardiovascular:      Rate and Rhythm: Normal rate and regular rhythm.      Pulses: Normal pulses.   Pulmonary:      Effort: Pulmonary effort is normal.      Breath sounds: Normal breath sounds.   Abdominal:      General: Bowel sounds are normal. There is no distension.      Palpations: Abdomen is soft.      Tenderness: There is no tenderness.   Musculoskeletal:         General: No swelling.   Skin:     General: Skin is warm.   Neurological:      General: No focal deficit present.      Mental Status: He is alert and oriented to person, place, and time.         Fluids    Intake/Output Summary (Last 24 hours) at 2/3/2020 0749  Last data filed at 2/3/2020 0600  Gross per 24 hour   Intake 2875 ml   Output --   Net 2875 ml       Laboratory  Recent Labs     02/01/20  1509 02/02/20  0310 02/03/20  0242   WBC 21.5* 15.6* 12.5*   RBC 5.10 4.85 4.62*   HEMOGLOBIN 15.7 14.6 14.1   HEMATOCRIT 45.6 43.7 42.9   MCV 89.4 90.1 92.9   MCH 30.8 30.1 30.5   MCHC 34.4 33.4* 32.9*   RDW 42.2 42.8 43.8   PLATELETCT 179 148* 136*   MPV 9.6 9.0 9.5     Recent Labs     02/01/20  1509 02/02/20  0310 02/03/20  0242   SODIUM 129* 136 135   POTASSIUM 4.4 4.1 4.1   CHLORIDE 98 106 103   CO2 22 23 26   GLUCOSE 147* 150* 104*   BUN 16 13 8   CREATININE 1.05 0.83 0.84   CALCIUM 10.0 9.0 9.2     Recent Labs     02/01/20  1509   INR 1.09               Imaging  DX-CHEST-PORTABLE (1 VIEW)   Final Result         1. No acute cardiopulmonary abnormalities are identified.           Assessment/Plan  Strep pharyngitis  Assessment & Plan  Started empirically on ceftriaxone  Improving  IV fluid support  Full liquid diet  Tylenol for fever      Sepsis (HCC)  Assessment & Plan  This is Sepsis Present on admission  SIRS criteria identified on  my evaluation include: Fever, with temperature greater than 101 deg F, Tachycardia, with heart rate greater than 90 BPM and Leukocyosis, with WBC greater than 12,000  Source is upper respiratory infection, strep pharyngitis  Sepsis protocol initiated  Fluid resuscitation ordered per protocol  IV antibiotics as appropriate for source of sepsis  While organ dysfunction may be noted elsewhere in this problem list or in the chart, degree of organ dysfunction does not meet CMS criteria for severe sepsis  Continue IV ceftriaxone          Leucocytosis  Assessment & Plan  Related to pharyngitis  WBC is 12.5 and improving  Follow CBC in the morning    Bipolar disorder (HCC)  Assessment & Plan  Continue outpatient medications  Mood is stable.  Denies suicidal homicidal ideations    Smoking  Assessment & Plan  Spent approx 5 mins on Tobacco cessation education . Discussed options of nicotine patch, medical treatment with wellbutrin and chantix. Discussed the benefits of quitting smoking and risks of continued smoking including cardiovascular disease, cancer and COPD.   Code 76446      Hyponatremia  Assessment & Plan  Na is 135 and improving  We will start IV normal saline.  Monitor sodium in am    Hyperglycemia  Assessment & Plan  He denies history of diabetes.  Likely stress related      HIV (human immunodeficiency virus infection) ([facility])  Assessment & Plan  Continue antiretroviral regimen.  Patient reportedly has been compliant on last CD4 count at [facility] a few days ago 1200 (?)  Follow with [facility]       VTE prophylaxis: ambulatory

## 2020-02-04 ENCOUNTER — PATIENT OUTREACH (OUTPATIENT)
Dept: HEALTH INFORMATION MANAGEMENT | Facility: OTHER | Age: 40
End: 2020-02-04

## 2020-02-04 VITALS
SYSTOLIC BLOOD PRESSURE: 117 MMHG | OXYGEN SATURATION: 96 % | WEIGHT: 165.34 LBS | HEART RATE: 69 BPM | DIASTOLIC BLOOD PRESSURE: 76 MMHG | RESPIRATION RATE: 20 BRPM | HEIGHT: 70 IN | BODY MASS INDEX: 23.67 KG/M2 | TEMPERATURE: 97.6 F

## 2020-02-04 PROCEDURE — A9270 NON-COVERED ITEM OR SERVICE: HCPCS | Performed by: INTERNAL MEDICINE

## 2020-02-04 PROCEDURE — 99239 HOSP IP/OBS DSCHRG MGMT >30: CPT | Performed by: INTERNAL MEDICINE

## 2020-02-04 PROCEDURE — 700102 HCHG RX REV CODE 250 W/ 637 OVERRIDE(OP): Performed by: INTERNAL MEDICINE

## 2020-02-04 PROCEDURE — 700111 HCHG RX REV CODE 636 W/ 250 OVERRIDE (IP): Performed by: INTERNAL MEDICINE

## 2020-02-04 RX ORDER — AMOXICILLIN 875 MG/1
875 TABLET, COATED ORAL 2 TIMES DAILY
Qty: 14 TAB | Refills: 0 | Status: SHIPPED | OUTPATIENT
Start: 2020-02-04 | End: 2020-02-11

## 2020-02-04 RX ORDER — LIDOCAINE HYDROCHLORIDE 20 MG/ML
15 SOLUTION OROPHARYNGEAL
Qty: 1200 ML | Refills: 0 | Status: SHIPPED | OUTPATIENT
Start: 2020-02-04

## 2020-02-04 RX ORDER — AMOXICILLIN 875 MG/1
875 TABLET, COATED ORAL 2 TIMES DAILY
Qty: 14 TAB | Refills: 0 | Status: SHIPPED
Start: 2020-02-04 | End: 2020-02-04 | Stop reason: SDUPTHER

## 2020-02-04 RX ADMIN — SENNOSIDES AND DOCUSATE SODIUM 2 TABLET: 8.6; 5 TABLET ORAL at 04:12

## 2020-02-04 RX ADMIN — LAMOTRIGINE 50 MG: 100 TABLET ORAL at 04:12

## 2020-02-04 RX ADMIN — EMTRICITABINE AND TENOFOVIR DISOPROXIL FUMARATE 1 TABLET: 200; 300 TABLET, FILM COATED ORAL at 04:12

## 2020-02-04 RX ADMIN — RALTEGRAVIR 400 MG: 400 TABLET, FILM COATED ORAL at 04:12

## 2020-02-04 RX ADMIN — OXYCODONE HYDROCHLORIDE 10 MG: 10 TABLET ORAL at 04:19

## 2020-02-04 RX ADMIN — ENOXAPARIN SODIUM 40 MG: 100 INJECTION SUBCUTANEOUS at 04:13

## 2020-02-04 RX ADMIN — NICOTINE 14 MG: 14 PATCH TRANSDERMAL at 04:12

## 2020-02-04 NOTE — PROGRESS NOTES
Discharge instructions give to patient & wife at bedside. Pt verbalizes understanding and states plans for follow up. New and home medications reviewed, post discharge activity level and worsening of symptoms needing follow up care discussed. PIV cathlon removed. All belongings accounted for, all questions answered at this time. Pt walked with wife to car.

## 2020-02-04 NOTE — DISCHARGE SUMMARY
Discharge Summary    CHIEF COMPLAINT ON ADMISSION  Chief Complaint   Patient presents with   • Flu Like Symptoms       Reason for Admission  flu like symptoms     Admission Date  2/1/2020    CODE STATUS  Full Code    HPI & HOSPITAL COURSE:     39 years old male admitted with sore throat and found to have acute pharyngitis from strep infection.  Please refer to H&P for detail.  He was initially also found to have sepsis and sepsis protocol was initiated.  His symptoms continue to improve and his sepsis have resolved upon discharge.  His sore throat continue to improve slowly as well.  He has been afebrile and his vital has been stable.  He will be discharged home today with oral amoxicillin.  He was instructed to come back to ER if his symptoms get worse.  I saw and examined the patient today.  Please call [phone] to schedule PCP appointment for patient.        Therefore, he is discharged in fair and stable condition to home with close outpatient follow-up.    The patient met 2-midnight criteria for an inpatient stay at the time of discharge.    Discharge Date  02/04/20      FOLLOW UP ITEMS POST DISCHARGE      DISCHARGE DIAGNOSES  Active Problems:    Sepsis (HCC) POA: Unknown    Strep pharyngitis POA: Unknown    HIV (human immunodeficiency virus infection) ([facility]) POA: Unknown    Hyperglycemia POA: Unknown    Hyponatremia POA: Unknown    Smoking POA: Unknown    Bipolar disorder ([facility]) POA: Unknown    Leucocytosis POA: Unknown  Resolved Problems:    * No resolved hospital problems. *      FOLLOW UP  No future appointments.  PCP in 1 week            MEDICATIONS ON DISCHARGE     Medication List      START taking these medications      Instructions   amoxicillin 875 MG tablet  Commonly known as:  AMOXIL   Take 1 Tab by mouth 2 times a day for 7 days.  Dose:  875 mg        CONTINUE taking these medications      Instructions   acetaminophen 500 MG Tabs  Commonly known as:  TYLENOL   Take 1,000 mg by mouth every 6 hours  as needed for Moderate Pain.  Dose:  1,000 mg     Biktarvy -25 MG Tabs  Generic drug:  Bictegravir-Emtricitab-Tenofov   Take 1 Tab by mouth every day.  Dose:  1 Tab     LaMICtal ODT 42 x 50 MG & 14x100 MG Kit  Generic drug:  lamoTRIgine   Take 50 mg by mouth 2 Times a Day.  Dose:  50 mg            Allergies  No Known Allergies    DIET  Orders Placed This Encounter   Procedures   • Diet Order Low Fiber(GI Soft)     Standing Status:   Standing     Number of Occurrences:   1     Order Specific Question:   Diet:     Answer:   Low Fiber(GI Soft) [2]       ACTIVITY  As tolerated.  Weight bearing as tolerated    CONSULTATIONS      PROCEDURES      LABORATORY  Lab Results   Component Value Date    SODIUM 135 02/03/2020    POTASSIUM 4.1 02/03/2020    CHLORIDE 103 02/03/2020    CO2 26 02/03/2020    GLUCOSE 104 (H) 02/03/2020    BUN 8 02/03/2020    CREATININE 0.84 02/03/2020        Lab Results   Component Value Date    WBC 12.5 (H) 02/03/2020    HEMOGLOBIN 14.1 02/03/2020    HEMATOCRIT 42.9 02/03/2020    PLATELETCT 136 (L) 02/03/2020        Total time of the discharge process exceeds 38 minutes.

## 2020-02-04 NOTE — PROGRESS NOTES
Pt is A&Ox4. Pt is resting in bed, no signs of labored breathing or pain. Pt on RA. Call light & personal belongings within reach, bed in lowest position & locked. Droplet isolation precautions in place and education provided. Pt updated on plan of care for the day. Pt declines any additional needs at this time. Will continue to monitor.

## 2020-02-04 NOTE — CARE PLAN
Problem: Knowledge Deficit  Goal: Knowledge of disease process/condition, treatment plan, diagnostic tests, and medications will improve  Outcome: PROGRESSING AS EXPECTED    Updated on plan of care and medications. Pt verbalized understanding.      Problem: Pain Management  Goal: Pain level will decrease to patient's comfort goal  Outcome: PROGRESSING AS EXPECTED    PRN pain medication given. Will continue to monitor throughout shift.

## 2020-02-04 NOTE — PROGRESS NOTES
Pt resting in bed throughout shift. PRN pain medication given. Updated on plan of care and medications. Pt verbalized understanding. Wife at bedside. No other complaints at this time. Fall precautions in place. Call bell and bedside table within reach. Hourly rounding completed. Will continue to monitor.

## 2020-02-04 NOTE — DISCHARGE INSTRUCTIONS
Discharge Instructions    Discharged to home by car with relative. Discharged via walking, hospital escort: Yes.  Special equipment needed: Not Applicable    Be sure to schedule a follow-up appointment with your primary care doctor or any specialists as instructed.     Discharge Plan:   Diet Plan: Discussed  Activity Level: Discussed  Smoking Cessation Offered: Patient Refused  Confirmed Follow up Appointment: Patient to Call and Schedule Appointment  Confirmed Symptoms Management: Discussed  Medication Reconciliation Updated: Yes  Influenza Vaccine Indication: Not indicated: Previously immunized this influenza season and > 8 years of age    I understand that a diet low in cholesterol, fat, and sodium is recommended for good health. Unless I have been given specific instructions below for another diet, I accept this instruction as my diet prescription.   Other diet: Advance as tolerated    Special Instructions: None    · Is patient discharged on Warfarin / Coumadin?   No     Strep Infections  Streptococcal (strep) infections are caused by streptococcal germs (bacteria). Strep infections are very contagious. Strep infections can occur in:  · Ears.   · The nose.   · The throat.   · Sinuses.   · Skin.   · Blood.   · Lungs.   · Spinal fluid.   · Urine.   Strep throat is the most common bacterial infection in children. The symptoms of a Strep infection usually get better in 2 to 3 days after starting medicine that kills germs (antibiotics). Strep is usually not contagious after 36 to 48 hours of antibiotic treatment. Strep infections that are not treated can cause serious complications. These include gland infections, throat abscess, rheumatic fever and kidney disease.  DIAGNOSIS   The diagnosis of strep is made by:  · A culture for the strep germ.   TREATMENT   These infections require oral antibiotics for a full 10 days, an antibiotic shot or antibiotics given into the vein (intravenous, IV).  HOME CARE INSTRUCTIONS    · Be sure to finish all antibiotics even if feeling better.   · Only take over-the-counter medicines for pain, discomfort and or fever, as directed by your caregiver.   · Close contacts that have a fever, sore throat or illness symptoms should see their caregiver right away.   · You or your child may return to work, school or  if the fever and pain are better in 2 to 3 days after starting antibiotics.   SEEK MEDICAL CARE IF:   · You or your child has an oral temperature above 102° F (38.9° C).   · Your baby is older than 3 months with a rectal temperature of 100.5° F (38.1° C) or higher for more than 1 day.   · You or your child is not better in 3 days.   SEEK IMMEDIATE MEDICAL CARE IF:   · You or your child has an oral temperature above 102° F (38.9° C), not controlled by medicine.   · Your baby is older than 3 months with a rectal temperature of 102° F (38.9° C) or higher.   · Your baby is 3 months old or younger with a rectal temperature of 100.4° F (38° C) or higher.   · There is a spreading rash.   · There is difficulty swallowing or breathing.   · There is increased pain or swelling.   Document Released: 01/25/2006 Document Revised: 03/11/2013 Document Reviewed: 11/03/2010  Document [employer]® Patient Information ©2013 [employer].    Sepsis, Adult  Sepsis is a serious infection of your blood or tissues that affects your whole body. The infection that causes sepsis may be bacterial, viral, fungal, or parasitic. Sepsis may be life threatening. Sepsis can cause your blood pressure to drop. This may result in shock. Shock causes your central nervous system and your organs to stop working correctly.  What increases the risk?  Sepsis can happen in anyone, but it is more likely to happen in people who have weakened immune systems.  What are the signs or symptoms?  Symptoms of sepsis can include:  · Fever or low body temperature (hypothermia).  · Rapid breathing (hyperventilation).  · Chills.  · Rapid heartbeat  (tachycardia).  · Confusion or light-headedness.  · Trouble breathing.  · Urinating much less than usual.  · Cool, clammy skin or red, flushed skin.  · Other problems with the heart, kidneys, or brain.  How is this diagnosed?  Your health care provider will likely do tests to look for an infection, to see if the infection has spread to your blood, and to see how serious your condition is. Tests can include:  · Blood tests, including cultures of your blood.  · Cultures of other fluids from your body, such as:  ¨ Urine.  ¨ Pus from wounds.  ¨ Mucus coughed up from your lungs.  · Urine tests other than cultures.  · X-ray exams or other imaging tests.  How is this treated?  Treatment will begin with elimination of the source of infection. If your sepsis is likely caused by a bacterial or fungal infection, you will be given antibiotic or antifungal medicines.  You may also receive:  · Oxygen.  · Fluids through an IV tube.  · Medicines to increase your blood pressure.  · A machine to clean your blood (dialysis) if your kidneys fail.  · A machine to help you breathe if your lungs fail.  Get help right away if:  You get an infection or develop any of the signs and symptoms of sepsis after surgery or a hospitalization.  This information is not intended to replace advice given to you by your health care provider. Make sure you discuss any questions you have with your health care provider.  Document Released: 09/15/2004 Document Revised: 05/25/2017 Document Reviewed: 08/25/2014  [employer] Interactive Patient Education © 2017 [employer]    Depression / Suicide Risk    As you are discharged from this [facility] facility, it is important to learn how to keep safe from harming yourself.    Recognize the warning signs:  · Abrupt changes in personality, positive or negative- including increase in energy   · Giving away possessions  · Change in eating patterns- significant weight changes-  positive or negative  · Change in  sleeping patterns- unable to sleep or sleeping all the time   · Unwillingness or inability to communicate  · Depression  · Unusual sadness, discouragement and loneliness  · Talk of wanting to die  · Neglect of personal appearance   · Rebelliousness- reckless behavior  · Withdrawal from people/activities they love  · Confusion- inability to concentrate     If you or a loved one observes any of these behaviors or has concerns about self-harm, here's what you can do:  · Talk about it- your feelings and reasons for harming yourself  · Remove any means that you might use to hurt yourself (examples: pills, rope, extension cords, firearm)  · Get professional help from the community (Mental Health, Substance Abuse, psychological counseling)  · Do not be alone:Call your Safe Contact- someone whom you trust who will be there for you.  · Call your local CRISIS HOTLINE 979-0359 or 127-481-9752  · Call your local Children's Mobile Crisis Response Team [place] (370) 699-7863 or www.[employer]  · Call the toll free National Suicide Prevention Hotlines   · National Suicide Prevention Lifeline 101-406-CQGB (0901)  · National Hope Line Network 800-SUICIDE (730-2891)

## 2020-02-05 NOTE — DISCHARGE PLANNING
Medication reconcilliation completed. Medications delivered to patient at bedside. Patient counseled.     Fortino Song   Home Medication Instructions JOSH:[id number]    Printed on:02/04/20 6331   Medication Information                      amoxicillin (AMOXIL) 875 MG tablet  Take 1 Tab by mouth 2 times a day for 7 days.

## 2020-02-06 LAB
BACTERIA BLD CULT: NORMAL
BACTERIA BLD CULT: NORMAL
SIGNIFICANT IND 70042: NORMAL
SIGNIFICANT IND 70042: NORMAL
SITE SITE: NORMAL
SITE SITE: NORMAL
SOURCE SOURCE: NORMAL
SOURCE SOURCE: NORMAL

## 2020-02-06 NOTE — DOCUMENTATION QUERY
[facility]                                                                       Query Response Note      PATIENT:               BETTINA LOZA  ACCT #:                  [id number]  MRN:                     [id number]  :                      1980  ADMIT DATE:       2020 2:27 PM  DISCH DATE:        2020 10:01 AM  RESPONDING  PROVIDER #:        [id number]           QUERY TEXT:    Sepsis and HIV infection are documented in the Medical Record. Please clarify the relationship, if any, between sepsis and HIV infection.     NOTE: If an appropriate response is not listed below, please respond with a new note.    The patient's Clinical Indicators include:  Per Progress Notes  Sepsis  -Source is upper respiratory infection, strep pharyngitis   HIV Infection  -Continue antiretroviral regimen  -reportedly has been compliant on last CD4 count     Treatment:   Sepsis protocol, IV NS 1000ml bolus, Rocephin, Truvada, & Isentress    Risk Factors:   HIV Infection, sepsis, & Strep pharyngitis  Options provided:   -- Sepsis is due to or associated with HIV Infection   -- Unrelated to each other   -- Unable to determine      Query created by: Deedee Luu on 2020 11:08 AM    RESPONSE TEXT:    Unrelated to each other          Electronically signed by:  JEANNINE STOVER MD 2020 12:11 PM

## 2020-02-07 NOTE — DOCUMENTATION QUERY
Former[facility], later [facility]                                                                       Query Response Note      PATIENT:               BETTINA LOZA  ACCT #:                  [id number]  MRN:                     [id number]  :                      1980  ADMIT DATE:       2020 2:27 PM  DISCH DATE:        2020 10:01 AM  RESPONDING  PROVIDER #:        [id number]           QUERY TEXT:    HIV (Human immunodeficiency virus) infection is documented in the medical record.  Please specify the type.    NOTE:  If an appropriate response is not listed below, please respond with a new note.    The patient's Clinical Indicators include:  Per H&P  -history of HIV, compliant with antiretroviral medications  -last time visited [facility] on 2020 & reportedly CD4 count was 1200 at that time    Treatment:   Truvada, Isentress, & outpatient follow up     Risk Factors:   HIV Infection, sepsis, & Strep pharyngitis  Options provided:   -- Asymptomatic HIV infection   -- Symptomatic HIV infection   -- Acquired immune deficiency syndrome (AIDS)   -- Unable to determine      Query created by: Deedee Luu on 2020 8:17 AM    RESPONSE TEXT:    Asymptomatic HIV infection          Electronically signed by:  JEANNINE STOVER MD 2020 8:36 AM

## 2020-05-13 ENCOUNTER — HOSPITAL ENCOUNTER (EMERGENCY)
Facility: MEDICAL CENTER | Age: 40
End: 2020-05-13
Attending: EMERGENCY MEDICINE
Payer: MEDICAID

## 2020-05-13 VITALS
WEIGHT: 150.35 LBS | HEART RATE: 90 BPM | RESPIRATION RATE: 19 BRPM | SYSTOLIC BLOOD PRESSURE: 131 MMHG | TEMPERATURE: 97.2 F | DIASTOLIC BLOOD PRESSURE: 88 MMHG | OXYGEN SATURATION: 99 % | HEIGHT: 70 IN | BODY MASS INDEX: 21.53 KG/M2

## 2020-05-13 DIAGNOSIS — B20 HISTORY OF HIV INFECTION (HCC): ICD-10-CM

## 2020-05-13 DIAGNOSIS — Z20.822 SUSPECTED COVID-19 VIRUS INFECTION: ICD-10-CM

## 2020-05-13 LAB — COVID ORDER STATUS COVID19: NORMAL

## 2020-05-13 PROCEDURE — 99283 EMERGENCY DEPT VISIT LOW MDM: CPT

## 2020-05-13 PROCEDURE — G2023 SPECIMEN COLLECT COVID-19: HCPCS | Performed by: EMERGENCY MEDICINE

## 2020-05-13 ASSESSMENT — FIBROSIS 4 INDEX: FIB4 SCORE: 0.84

## 2020-05-13 NOTE — ED TRIAGE NOTES
"Fortino Song   39 y.o. male   Chief Complaint   Patient presents with   • Headache   • Generalized Body Aches      The patient presents to the ED via triage for evaluation of headache and generalized body aches. The patient reports that he has been experiencing these symptoms for a couple of days. 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     The patient states that [place] public housing, \"They think that I brought covid into the colony. I want to clear that up.\"    During assessment patient exhibits pressured speech and is rocking back and forth. Patient denies SI, HI or any visual, auditory or tactile hallucinations. Denies alcohol or drug use this evening.    [facility] Patient instructed on triage process. Patient encouraged to alert staff of any changes.             "

## 2020-05-13 NOTE — ED PROVIDER NOTES
"ED Provider Note    CHIEF COMPLAINT  Chief Complaint   Patient presents with   • Headache   • Generalized Body Aches        [facility]    Primary care provider: Enriqueta Arredondo M.D.   History obtained from: Patient  History limited by: None     Fortino Song is a 39 y.o. male who presents to the ED requesting COVID testing because he has been having headache, body aches, cough and slight sore throat for the past couple of days.  Patient states that he may have been exposed at work but has not noticed any coworkers who have been sick.  He denies recent travels.  He denies fever/chills/shortness of breath or difficulty breathing/nausea/vomiting/diarrhea/dysuria/rash/change in taste or smell.  He denies pain anywhere else.  Patient states that he has HIV with his last CD4 count at 1300 and undetectable viral load.    REVIEW OF SYSTEMS  Please see HPI for pertinent positives/negatives.  All other systems reviewed and are negative.     PAST MEDICAL HISTORY  Past Medical History:   Diagnosis Date   • Sepsis ([facility]) 2/1/2020   • Back pain    • Broken tooth    • HIV (human immunodeficiency virus infection) ([facility])    • Psychiatric disorder     bipolar, schizo affective   • Substance abuse ([facility])         SURGICAL HISTORY  Past Surgical History:   Procedure Laterality Date   • EXPLORATORY LAPAROTOMY N/A 10/14/2015    Procedure: EXPLORATORY LAPAROTOMY;  Surgeon: Mo Khan M.D.;  Location: SURGERY [facility];  Service:    • EXPLORATORY LAPAROTOMY  10/14/2015        SOCIAL HISTORY  Social History     Tobacco Use   • Smoking status: Current Every Day Smoker     Packs/day: 0.50     Years: 20.00     Pack years: 10.00     Types: Cigarettes   • Smokeless tobacco: Current User   Substance and Sexual Activity   • Alcohol use: No     Comment: occ; quit \"a while ago\"   • Drug use: Not Currently     Types: Oral, Inhaled     Comment: marijuana   • Sexual activity: Not on file        FAMILY HISTORY  History reviewed. No pertinent family " "history.     CURRENT MEDICATIONS  Home Medications    **Home medications have not yet been reviewed for this encounter**          ALLERGIES  No Known Allergies     PHYSICAL EXAM  VITAL SIGNS: /88   Pulse 90   Temp 36.2 °C (97.2 °F) (Oral)   Resp 19   Ht 1.778 m (5' 10\")   Wt 68.2 kg (150 lb 5.7 oz)   SpO2 99%   BMI 21.57 kg/m²  @CLIFF[[id number]::@     Pulse ox interpretation: 99% I interpret this pulse ox as normal     Constitutional: Well developed, well nourished, alert in no apparent distress, nontoxic appearance    HENT: No external signs of trauma, normocephalic, oropharynx moist and clear, nose normal    Eyes: PERRL, conjunctiva without erythema, no discharge, no icterus    Neck: Soft and supple, trachea midline, no stridor, no tenderness, no LAD, no JVD, good ROM    Cardiovascular: Regular rate and rhythm, no murmurs/rubs/gallops, strong distal pulses and good perfusion    Thorax & Lungs: No respiratory distress, CTAB   Abdomen: Soft, nontender, nondistended, no guarding, no rebound, normal BS    Back: No CVAT    Extremities: No cyanosis, no edema, no gross deformity, good ROM, intact distal pulses with brisk cap refill    Skin: Warm, dry, no pallor/cyanosis, no rash noted    Lymphatic: No lymphadenopathy noted    Neuro: A/O times 3, no focal deficits noted, ambulating without difficulty  Psychiatric: Cooperative, slightly anxious      DIAGNOSTIC STUDIES / PROCEDURES        LABS  All labs reviewed by me.     Results for orders placed or performed during the hospital encounter of 05/13/20   COVID/SARS CoV-2    Specimen: Nasopharyngeal; Respirate   Result Value Ref Range    COVID Order Status Discharge-Recvd    2019-nCoV RNA ([facility])   Result Value Ref Range    2019-nCoV Source NP Swab         RADIOLOGY  The radiologist's interpretation of all radiological studies have been reviewed by me.     No orders to display          COURSE & MEDICAL DECISION MAKING  Nursing notes, VS, PMSFHx reviewed in chart. "     Review of past medical records shows the patient was last admitted to this hospital February 1, 2020 for flulike symptoms due to acute strep pharyngitis and discharged on February 4, 2020.      Differential diagnoses considered include but are not limited to: URI, pneumonia, bronchitis, influenza, pharyngitis/tonsillitis, epiglottitis, peritonsillar abscess, retropharyngeal abscess, mononucleosis, viral syndrome, allergic rhinitis      History and physical exam as above.  This is a well-appearing but slightly anxious patient busy playing on his cell phone in no acute distress and nontoxic in appearance with a benign exam and unremarkable vital signs.  Patient with history of HIV but appears to be well controlled with undetectable viral load and CD4 count of 1300.  Low clinical suspicion at this time for serious acute pathology such as sepsis, meningitis, pharyngeal abscess, epiglottitis, ARDS, pneumonia given the history/exam/findings.  COVID-19 testing was performed and patient will be contacted with the result.  Discussed with patient supportive care including hydration, good hygiene and self-isolation measures.  Return to ED precautions were given.  Patient verbalized understanding and agreed with plan of care with no further questions or concerns.       The patient is referred to a primary physician for blood pressure management, diabetic screening, and for all other preventative health concerns.       FINAL IMPRESSION  1. Suspected COVID-19 virus infection Acute   2. History of HIV infection (HCC) Chronic          DISPOSITION  Patient will be discharged home in stable condition.       FOLLOW UP  Enriqueta Arredondo M.D.  [address] [address]  [phone]    Call today      [facility], Emergency Dept  [address] [facility] [id number] [phone]    If symptoms worsen         OUTPATIENT MEDICATIONS  Discharge Medication List as of 5/13/2020  4:03 AM              Electronically signed by: Jeyson Samson D.O., 5/13/2020 4:01 AM      Portions of this record were made with voice recognition software.  Despite my review, spelling/grammar/context errors may still remain.  Interpretation of this chart should be taken in this context.

## 2020-05-13 NOTE — LETTER
5/15/2020               Fortino Song  [address] [place] [address]        Dear Fortino (MR#[id number]),    This letter is to inform you that your COVID-19 test result is NEGATIVE.  This means that the virus that causes COVID-19 was not found in your sample.      A review of your test during your recent visit requires that we notify you of the following:    Your nasal swab was negative for the novel coronavirus (COVID-19).     2019-nCoV Source  NP Swab     2019-nCoV RNA Interp  Not detected        You are encouraged to stay at home until you have had no fever for 72 hours without the use of fever reducing medications and your symptoms are improving. There is no need for further self-quarantine for 14 days for COVID-19 unless otherwise directed by the Health Department.     For any further questions regarding COVID-19, please contact the [facility] at [phone].  Thank you for your cooperation in the matter.      Sincerely,    Susy Moran, PharmD  Ph: [phone]  [facility] Team

## 2020-05-14 LAB
SARS-COV-2 RNA RESP QL NAA+PROBE: NOT DETECTED
SPECIMEN SOURCE: NORMAL

## 2020-05-15 NOTE — ED NOTES
COVID-19 Test Follow Up  05/15/20      Patient tested negative for COVID-19. I have informed the patient of the result by phone. Encouraged to stay at home until no fever for 72 hours without the use of fever reducing medications and symptoms improving. Informed there is no need to further self-isolate for 14 days for COVID-19 unless otherwise directed by the Health Dept.     2019-nCoV Source  NP Swab     2019-nCoV RNA Interp  Not detected      He has requested a copy of this information be sent to his email: [email or web address].  I have provided this.    Susy Moran, PharmD

## 2022-07-14 NOTE — ED TRIAGE NOTES
Fortino Song presents to triage reporting body aches, n/v at home, chills, sore throat x1 day. No cough noted. Mask placed on pt. Febrile.     Pt speaking in full sentences, NAD noted. Pt educated of triage process, placed back in waiting area pending room assignment.     negative

## 2025-02-05 ENCOUNTER — HOSPITAL ENCOUNTER (EMERGENCY)
Facility: MEDICAL CENTER | Age: 45
End: 2025-02-05
Attending: EMERGENCY MEDICINE
Payer: MEDICAID

## 2025-02-05 VITALS
WEIGHT: 158.07 LBS | SYSTOLIC BLOOD PRESSURE: 121 MMHG | DIASTOLIC BLOOD PRESSURE: 74 MMHG | TEMPERATURE: 98 F | OXYGEN SATURATION: 99 % | RESPIRATION RATE: 16 BRPM | BODY MASS INDEX: 22.63 KG/M2 | HEART RATE: 86 BPM | HEIGHT: 70 IN

## 2025-02-05 DIAGNOSIS — F15.10 METHAMPHETAMINE ABUSE, EPISODIC (HCC): ICD-10-CM

## 2025-02-05 DIAGNOSIS — R44.3 HALLUCINATIONS: ICD-10-CM

## 2025-02-05 LAB
AMPHET UR QL SCN: POSITIVE
BARBITURATES UR QL SCN: NEGATIVE
BENZODIAZ UR QL SCN: NEGATIVE
BZE UR QL SCN: NEGATIVE
CANNABINOIDS UR QL SCN: NEGATIVE
FENTANYL UR QL: NEGATIVE
METHADONE UR QL SCN: NEGATIVE
OPIATES UR QL SCN: NEGATIVE
OXYCODONE UR QL SCN: NEGATIVE
PCP UR QL SCN: NEGATIVE
POC BREATHALIZER: 0 PERCENT (ref 0–0.01)
PROPOXYPH UR QL SCN: NEGATIVE

## 2025-02-05 PROCEDURE — 302970 POC BREATHALIZER

## 2025-02-05 PROCEDURE — 80307 DRUG TEST PRSMV CHEM ANLYZR: CPT

## 2025-02-05 PROCEDURE — 99284 EMERGENCY DEPT VISIT MOD MDM: CPT

## 2025-02-05 ASSESSMENT — FIBROSIS 4 INDEX: FIB4 SCORE: 2.9

## 2025-02-05 NOTE — ED TRIAGE NOTES
"Chief Complaint   Patient presents with    Hallucinations     Pt c/o auditory hallucination \"for a while.\" Denies SI/HI.  Pt states he is not currently on medication.       Pt ambulatory from lobby with steady gait. Pt requesting to be put on 72hr hold and requesting resources.      Pt is alert and oriented, speaking in full sentences, follows commands and responds appropriately to questions. Resp are even and unlabored.      Pt placed in lobby. Pt educated on triage process. Pt encouraged to alert staff for any changes.     Patient and staff wearing appropriate PPE.    /76   Pulse 98   Temp 36.8 °C (98.2 °F) (Temporal)   Resp 18   Ht 1.778 m (5' 10\")   Wt 71.7 kg (158 lb 1.1 oz)   SpO2 98%      "

## 2025-02-05 NOTE — ED NOTES
Pt cooperative with staff. Changed into a hospital cloth gown. Denies SI/HI.  Breathalyzer done=.000    Concentration Of Solution Injected (Mg/Ml): 10.0

## 2025-02-05 NOTE — ED NOTES
Pt discharge going to [facility], peer support calling [facility] for ride. Pt given discharge instructions Pt verbalized understanding, all questions answered ,vss upon d/c. Pt steady on feet upon discharge

## 2025-02-05 NOTE — DISCHARGE PLANNING
"Pt does not endorse SI/HI or visual hallucinations, some non-specific auditory hallucinations. Does disclose \"having meth in my system.\"    Collaborated with Tyrese HARPER PRSS for resources. Pt given resources for behavioral health services in the community: [facility], [facility], [place], as well as information for Jacinda to replace his missing insurance card, chemical dependency program with [facility], [facility] for transportation for medical appointments, resources for shelters, [place] Crisis line and [place] warmline.    Liz Harmon RN - Alert Team  "

## 2025-02-05 NOTE — ED NOTES
Up ambulated to the restroom with steady gait. Urine sample provided, specimen sent to lab.  Asked RN for food. [place] x 2 and water given to pt.

## 2025-02-05 NOTE — ED PROVIDER NOTES
"ED Provider Note    CHIEF COMPLAINT  Chief Complaint   Patient presents with    Hallucinations     Pt c/o auditory hallucination \"for a while.\" Denies SI/HI.  Pt states he is not currently on medication.         EXTERNAL RECORDS REVIEWED  Reviewed previous ER encounters [facility] encounters    HPI/ROS  LIMITATION TO HISTORY   Patient is a poor historian  OUTSIDE HISTORIAN(S):  None    Fortino Song is a 44 y.o. male who presents for evaluation of auditory hallucinations paranoia.  The patient has a known history of underlying schizophrenia.  He has apparently been off meds for several months.  He also has concomitant methamphetamine abuse history and reports last used methamphetamine yesterday.  He specifically denies visual hallucinations or command hallucinations to hurt himself or others.  He has no endorsement of suicidality or thoughts of wanting to hurt others.    PAST MEDICAL HISTORY   has a past medical history of Back pain, Broken tooth, HIV (human immunodeficiency virus infection) ([facility]), Psychiatric disorder, Sepsis ([facility]) (2/1/2020), and Substance abuse ([facility]).    SURGICAL HISTORY   has a past surgical history that includes exploratory laparotomy (N/A, 10/14/2015) and exploratory laparotomy (10/14/2015).    FAMILY HISTORY  No family history on file.    SOCIAL HISTORY  Social History     Tobacco Use    Smoking status: Every Day     Current packs/day: 0.50     Average packs/day: 0.5 packs/day for 20.0 years (10.0 ttl pk-yrs)     Types: Cigarettes    Smokeless tobacco: Current   Vaping Use    Vaping status: Never Used   Substance and Sexual Activity    Alcohol use: No     Comment: occ; quit \"a while ago\"    Drug use: Not Currently     Types: Oral, Inhaled     Comment: marijuana    Sexual activity: Not on file     History of amphetamine abuse  CURRENT MEDICATIONS  Home Medications       Reviewed by Mahi Downing R.N. (Registered Nurse) on 02/05/25 at 1014  Med List Status: <None> " "    Medication Last Dose Status   acetaminophen (TYLENOL) 500 MG Tab  Active   Bictegravir-Emtricitab-Tenofov (BIKTARVY) -25 MG Tab  Active   lamoTRIgine (LAMICTAL ODT) 42 x 50 MG & 14x100 MG Kit  Active   lidocaine (XYLOCAINE) 2 % Solution  Active                    ALLERGIES  Allergies   Allergen Reactions    Other Drug Rash     Pt states one of the ingredients in the GI cocktail makes him break out in a rash.       PHYSICAL EXAM  VITAL SIGNS: /76   Pulse 98   Temp 36.8 °C (98.2 °F) (Temporal)   Resp 18   Ht 1.778 m (5' 10\")   Wt 71.7 kg (158 lb 1.1 oz)   SpO2 98%   BMI 22.68 kg/m²    Pulse ox interpretation: I interpret this pulse ox as normal.  Constitutional: Alert and oriented x 3, disheveled  HEENT: Atraumatic normocephalic, pupils are equal round reactive to light extraocular movements are intact. The nares is clear, external ears are normal, mouth shows moist mucous membranes normal dentition for age  Neck: Supple, no JVD no tracheal deviation  Cardiovascular: Regular rate and rhythm no murmur rub or gallop 2+ pulses peripherally x4  Thorax & Lungs: No respiratory distress, no wheezes rales or rhonchi, No chest tenderness.   GI: Soft nontender nondistended positive bowel sounds, no peritoneal signs  Skin: Warm dry no acute rash or lesion  Musculoskeletal: Moving all extremities with full range and 5 of 5 strength no acute  deformity  Neurologic: Cranial nerves III through XII are grossly intact no sensory deficit no cerebellar dysfunction   Psychiatric: Mild agitation, patient is oriented x 4.  Appears to have capacity.  Does not endorse homicidality or suicidality.  Has some fixed delusions but does not appear to be acutely psychotic        EKG/LABS  Results for orders placed or performed during the hospital encounter of 02/05/25   POC BREATHALIZER    Collection Time: 02/05/25 11:09 AM   Result Value Ref Range    POC Breathalizer 0.000 0.00 - 0.01 Percent   URINE DRUG SCREEN    " Collection Time: 02/05/25 11:38 AM   Result Value Ref Range    Amphetamines Urine Positive (A) Negative    Barbiturates Negative Negative    Benzodiazepines Negative Negative    Cocaine Metabolite Negative Negative    Fentanyl, Urine Negative Negative    Methadone Negative Negative    Opiates Negative Negative    Oxycodone Negative Negative    Phencyclidine -Pcp Negative Negative    Propoxyphene Negative Negative    Cannabinoid Metab Negative Negative        RADIOLOGY/PROCEDURES   No imaging indicated  COURSE & MEDICAL DECISION MAKING    ASSESSMENT, COURSE AND PLAN  Care Narrative:     This is a cooperative 44-year-old gentleman who has a history of underlying mental health illness exacerbated by chronic methamphetamine abuse.  Here he did not meet criteria to be placed on legal hold.  I feel that he does have capacity.  He has some auditory hallucinations but no command hallucinations to hurt himself or others.  He is positive for amphetamines which the patient admits to.  He was evaluated by life skills and they agree that no legalhold is indicated and they will provide resources for him to go to [facility]          ADDITIONAL PROBLEMS MANAGED      DISPOSITION AND DISCUSSIONS  I have discussed management of the patient with the following physicians and JILLIAN's: None    Discussion of management with other QHP or appropriate source(s): None    Escalation of care considered, and ultimately not performed: Considered blood testing, considered legal 2000    Barriers to care at this time, including but not limited to: Homeless, no PCP no resources.     Decision tools and prescription drugs considered including, but not limited to: None.    FINAL DIAGNOSIS  1. Hallucinations        2. Methamphetamine abuse, episodic (HCC)               Electronically signed by: Hood Rodriguez M.D., 2/5/2025 11:08 AM

## 2025-03-04 ENCOUNTER — HOSPITAL ENCOUNTER (EMERGENCY)
Facility: MEDICAL CENTER | Age: 45
End: 2025-03-04
Attending: STUDENT IN AN ORGANIZED HEALTH CARE EDUCATION/TRAINING PROGRAM
Payer: MEDICAID

## 2025-03-04 VITALS
HEIGHT: 70 IN | SYSTOLIC BLOOD PRESSURE: 104 MMHG | HEART RATE: 74 BPM | TEMPERATURE: 97.8 F | DIASTOLIC BLOOD PRESSURE: 68 MMHG | WEIGHT: 165.12 LBS | RESPIRATION RATE: 20 BRPM | BODY MASS INDEX: 23.64 KG/M2 | OXYGEN SATURATION: 94 %

## 2025-03-04 DIAGNOSIS — R45.851 SUICIDAL IDEATION: ICD-10-CM

## 2025-03-04 DIAGNOSIS — F15.10 METHAMPHETAMINE ABUSE (HCC): ICD-10-CM

## 2025-03-04 LAB
AMPHET UR QL SCN: POSITIVE
BARBITURATES UR QL SCN: NEGATIVE
BENZODIAZ UR QL SCN: NEGATIVE
BZE UR QL SCN: NEGATIVE
CANNABINOIDS UR QL SCN: POSITIVE
FENTANYL UR QL: POSITIVE
METHADONE UR QL SCN: NEGATIVE
OPIATES UR QL SCN: NEGATIVE
OXYCODONE UR QL SCN: NEGATIVE
PCP UR QL SCN: NEGATIVE
POC BREATHALIZER: 0 PERCENT (ref 0–0.01)
PROPOXYPH UR QL SCN: NEGATIVE

## 2025-03-04 PROCEDURE — 80307 DRUG TEST PRSMV CHEM ANLYZR: CPT

## 2025-03-04 PROCEDURE — 90791 PSYCH DIAGNOSTIC EVALUATION: CPT

## 2025-03-04 PROCEDURE — A9270 NON-COVERED ITEM OR SERVICE: HCPCS | Mod: UD | Performed by: STUDENT IN AN ORGANIZED HEALTH CARE EDUCATION/TRAINING PROGRAM

## 2025-03-04 PROCEDURE — 302970 POC BREATHALIZER: Performed by: STUDENT IN AN ORGANIZED HEALTH CARE EDUCATION/TRAINING PROGRAM

## 2025-03-04 PROCEDURE — 99285 EMERGENCY DEPT VISIT HI MDM: CPT

## 2025-03-04 PROCEDURE — 700102 HCHG RX REV CODE 250 W/ 637 OVERRIDE(OP): Mod: UD | Performed by: STUDENT IN AN ORGANIZED HEALTH CARE EDUCATION/TRAINING PROGRAM

## 2025-03-04 RX ORDER — DIAZEPAM 5 MG/1
10 TABLET ORAL ONCE
Status: COMPLETED | OUTPATIENT
Start: 2025-03-04 | End: 2025-03-04

## 2025-03-04 RX ADMIN — DIAZEPAM 10 MG: 5 TABLET ORAL at 02:15

## 2025-03-04 ASSESSMENT — FIBROSIS 4 INDEX: FIB4 SCORE: 4.41

## 2025-03-04 ASSESSMENT — PAIN DESCRIPTION - PAIN TYPE: TYPE: ACUTE PAIN

## 2025-03-04 NOTE — DISCHARGE PLANNING
Alert Team Note     Spoke to Marita at [facility], pt has been accepted. Requesting transfer today at 1100. Accepting is Dr. Vaughn    Notified bedside OSBALDO Judge, Dr. Hirsch, Alert Team OSBALDO Meza, and Alert Team ELIZABETH Hwang

## 2025-03-04 NOTE — ED NOTES
Pt resting in bed, breathing even and unlabored on RA, Sitter remains in LOS of pt for SI precautions 1:1 obs, bed rails up and fall precautions in place

## 2025-03-04 NOTE — ED NOTES
Breathalyzer done at bedside, sitter in place for SI precautions in LOS of pt with 1:1 obs. Alert team at bedside

## 2025-03-04 NOTE — ED NOTES
Psychiatric team at bedside   Patient resting on gurney, equal chest rise and fall, pt in 1:1 observation with sitter  in direct view , pt in hospital scrubs, no needs at this time

## 2025-03-04 NOTE — DISCHARGE PLANNING
Alert Team Note     Spoke to Sil at [facility] who wanted to verify pt's primary insurance. Consulted with KAITLYNN Trammell who verified pt's primary insurance is [employer] and secondary insurance is [employer].     Faxed updated facesheet to [facility].     @[id number], Spoke to Sil at [facility] declining pt due to no active payor. Notified Alert  Chana

## 2025-03-04 NOTE — DISCHARGE PLANNING
Legal Hold Transfer     Referral: Legal hold transfer to Mental Health Facility     Intervention: Notified by  Sagrario that pt has been accepted to [facility].     Pt's accepting physcian is Dr. Johana Cates to [facility]     Transport arranged through [facility]     The pt will be picked up at 1100      Notified Bedside RN Alfie, Alert Team OSBALDO Meza, and Dr. Hirsch of the departure time as well as accepting facility.      Transfer packet to be created with original legal hold and placed on chart.      Plan: Pt will be transferring to [facility] today at 1100 via [facility].

## 2025-03-04 NOTE — ED NOTES
EMS here to transport pt to [facility] , report to EMS , all questions answered , all belonging sent with pt

## 2025-03-04 NOTE — ED NOTES
Patient resting on gurney, equal chest rise and fall, pt in 1:1 observation with sitter  in direct view , pt in hospital scrubs, no needs at this time   Pt breakfast tray provided   Pt ambulated to restroom
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
## 2025-03-04 NOTE — ED NOTES
Patient resting on gurney, equal chest rise and fall, pt in 1:1 observation with sitter  in direct view , pt in hospital scrubs, no needs at this time   Lunch tray provided

## 2025-03-04 NOTE — ED NOTES
Bedside report received from off going RN/tech: Katina, assumed care of patient.  POC discussed with patient, all needs addressed at this time.       Fall risk interventions in place: Not Applicable (all applicable per [place] Fall risk assessment)   Continuous monitoring: Not Applicable   IVF/IV medications: Not Applicable   Oxygen: Room Air  Bedside sitter: Pt on L2k SI with 1:1 sitter Estefani (name), Report given to sitter, checklist completed, and checklist completed, stop sign in doorway  Isolation: Not Applicable    Patient resting on gurney, equal chest rise and fall, pt in 1:1 observation with sitter  in direct view , pt in hospital scrubs, no needs at this time

## 2025-03-04 NOTE — DISCHARGE PLANNING
Alert Team:     Referral faxed to [facility], [facility], [facility] and [facility].     [employer]

## 2025-03-04 NOTE — CONSULTS
"[facility]    INPATIENT ASSESSMENT    Name: Fortino Song  MRN: [id number]  : 1980  Age: 44 y.o.  Date of assessment: 3/4/2025  PCP: Pcp Not In Computer  Persons in attendance: Patient    HPI per medical record: \"Fortino Song is a 44 y.o. male who presents to the ED for evaluation of suicidal ideation. The patient reports a history of SI attempt in , and states he is suicidal currently. He states he has a plan in place to kill himself by using too much methamphetamine. He also states 1 day ago he attempted to cut himself, noting a scar to his left forearm. He reports using methaphetamine tonight prior to arrival.  Patient reports plan to intentionally overdose on fentanyl.\" Patient was referred to Behavioral Health due to suicidal ideation.    Psychotherapy Session Summary (as stated by Patient): Fortino Song is a 44 year old male seen sleeping on hospital bed and was difficult to arouse. Patients speech was soft, mumbled, and difficult to understand. Patient reports auditory hallucinations with no command voices. Patient reports SI with the intention of overdosing on fentanyl.     Patient was difficult to engage in interview process due to withdrawal symptoms: fatigue, increased appetite, irritability, difficulty concentrating, and aches and pains.  Patient spent a majority of the session lying down with eyes closed. Patient reports feeling like his \"toes are going to fall off\". Patient reports being thirsty and hungry and requested juice and snacks. Towards the end of our session, patient reports paranoid thinking, stating \"I don't know who you are!\". Patient reports having multiple wives, but not having intimate connections to these wives. Patient reports being diagnosed with Bipolar ll and ADHD.    Clinician was unable to complete full assessment due to current withdrawal symptoms. Clinician explained the process of a Legal Hold to patient. Patients only reason for living identified in " "session is \"stabilization\". Care Team entered room towards the end of the session discussing patient potentially being transferred to [facility].  Chief Complaint   Patient presents with    Suicidal Ideation     Admits to using meth tonight and says he is here for SI. Hx of attempts in 2003. Pt says he has a plan to shoot up with \"a lot\" of fentanyl like he did in 2003 when he coded for ten minutes.         CURRENT LIVING SITUATION/SOCIAL SUPPORT: Patient reports living in [place], but if currently living in [place] for a court appearance. Patient reports having \"multiple wives\" and states, \"I don't like any of them bitches\".    BEHAVIORAL HEALTH TREATMENT HISTORY  Does patient/parent report a history of prior behavioral health treatment for patient?   No:    SAFETY ASSESSMENT - SELF  Does patient acknowledge current or past symptoms of dangerousness to self? yes  Does parent/significant other report patient has current or past symptoms of dangerousness to self? N\A  Does presenting problem suggest symptoms of dangerousness to self? Yes:     Past Current    Suicidal Thoughts: [x]  [x]    Suicidal Plans: [x]  [x]    Suicidal Intent: [x]  [x]    Suicide Attempts: [x]  []    Self-Injury []  []      For any boxes checked above, provide detail: Patient reports previous SA in 2003. Patient reports thoughts, plan, and intent of suicide.    History of suicide by family member: no  History of suicide by friend/significant other: no  Recent change in frequency/specificity/intensity of suicidal thoughts or self-harm behavior? yes   Current access to firearms, medications, or other identified means of suicide/self-harm? yes - access to illicit substances  If yes, willing to restrict access to means of suicide/self-harm? No- not while in hospital  Protective factors present:  Reasons for living identified by patient: \"stabilization\"    SAFETY ASSESSMENT - OTHERS  Does patient acknowledge current or past symptoms of aggressive behavior " "or risk to others? no  Does parent/significant other report patient has current or past symptoms of aggressive behavior or risk to others?  N\A  Does presenting problem suggest symptoms of dangerousness to others? No    Crisis Safety Plan completed and copy given to patient? no    ABUSE/NEGLECT SCREENING  Does patient report feeling “unsafe” in his/her home, or afraid of anyone?  no  Does patient report any history of physical, sexual, or emotional abuse?  no  Does parent or significant other report any of the above? N\A  Is there evidence of neglect by self?  no  Is there evidence of neglect by a caregiver? no  Does the patient/parent report any history of CPS/APS/police involvement related to suspected abuse/neglect or domestic violence? no  Based on the information provided during the current assessment, is a mandated report of suspected abuse/neglect being made?  No    SUBSTANCE USE SCREENING  Yes:  Kodi all substances used in the past 30 days:      Last Use Amount   []   Alcohol     []   Marijuana     []   Heroin     []   Prescription Opioids  (used without prescription, for    recreation, or in excess of prescribed amount)     []   Other Prescription  (used without prescription, for    recreation, or in excess of prescribed amount)     []   Cocaine      [x]   Methamphetamine     []   \"\" drugs (ectasy, MDMA)     []   Other substances        UDS results: Amphetamines Urine (Positive), Fentanyl Urine (Positive), Cannabinoid Metab (Positive)  Breathalyzer results: Not assessed    What consequences does the patient associate with any of the above substance use and or addictive behaviors? Health problems: Patient is actively withdrawing from substances listed above.    Risk factors for detox (check all that apply):  []  Seizures   []  Diaphoretic (sweating)   []  Tremors   [x]  Hallucinations   []  Increased blood pressure   []  Decreased blood pressure   []  Other   []  None      [] Patient education on " risk factors for detoxification and instructed to return to ER as needed.      MENTAL STATUS              Participation: Limited verbal participation  Grooming: Disheveled  Orientation: Drowsy/Somnolent, Confused, and Evidence of hallucinations present  Behavior: Agitated, Aggressive, and Drowsy  Eye contact: Poor  Mood: Irritable  Affect: Expansive  Thought process: Flight of ideas  Thought content: Paranoia  Speech: Soft, Rapid, and mumbled  Perception: Evidence of auditory hallucination  Memory:  Recent:  Limited  Insight: Poor  Judgment:  Poor  Other:    Collateral information:   Source:   Significant other present in person:    Significant other by telephone   [facility]   [facility]- Reviewed   Other:      Unable to complete full assessment due to:   Acute intoxication   Patient declined to participate/engage   Patient verbally unresponsive   Significant perceptual distortions or behavioral disorganization             CLINICAL IMPRESSIONS:  Primary:  Stimulant Use Disorder  Secondary:  Patient reports Hx of Bipolar ll and ADHD                                       IDENTIFIED NEEDS/PLAN:  [Trigger DISPOSITION list for any items marked]      Imminent safety risk - self  Imminent safety risk - others    x Acute substance withdrawal   Psychosis/Impaired reality testing     Mood/anxiety   Substance use/Addictive behavior     Maladaptive behavior   Parent/child conflict     Family/Couples conflict   Biomedical     Housing   Financial      Legal  Occupational/Educational     Domestic violence   Other:     Recommendations and Observation Level:  Sitter: 1:1  Phone: no  Visitors: no  Personal belongings: no      Legal Hold: Marissa Espinoza, Student  Kely Molina, Ph.d., [facility]  3/4/2025     Length of intervention: 30 minutes

## 2025-03-04 NOTE — ED TRIAGE NOTES
"Chief Complaint   Patient presents with    Suicidal Ideation     Admits to using meth tonight and says he is here for SI. Hx of attempts in 2003. Pt says he has a plan to shoot up with \"a lot\" of fentanyl like he did in 2003 when he coded for ten minutes.        45 yo male to triage for above complaint. Ambulatory. Walking patient to green 37    Pt is alert and oriented, speaking in full sentences, follows commands and responds appropriately to questions.     /69   Pulse 71   Temp 36.6 °C (97.8 °F) (Temporal)   Resp 16   Ht 1.778 m (5' 10\")   Wt 74.9 kg (165 lb 2 oz)   SpO2 98%   BMI 23.69 kg/m²     "

## 2025-03-04 NOTE — DISCHARGE SUMMARY
"  ED Observation Discharge Summary    Patient:Fortino Song  Patient : 1980  Patient MRN: [id number]  Patient PCP: Pcp Not In Computer    Admit Date: 3/4/2025  Discharge Date and Time: 25 10:17 AM  Discharge Diagnosis: Suicidal ideation  Discharge Attending: Dirk Hirsch M.D.  Discharge Service: ED Observation    ED Course  Fortino is a 44 y.o. male who was evaluated at [facility] for suicidal ideation.  Medically cleared.  Ultimately requires admission.  Will be transferred to an outside psychiatric facility.    Discharge Exam:  /68   Pulse 74   Temp 36.6 °C (97.8 °F) (Temporal)   Resp 20   Ht 1.778 m (5' 10\")   Wt 74.9 kg (165 lb 2 oz)   SpO2 94%   BMI 23.69 kg/m² .    Constitutional: Awake and alert. Nontoxic  HENT:  Grossly normal  Eyes: Grossly normal  Neck: Normal range of motion  Cardiovascular: Normal heart rate   Thorax & Lungs: No respiratory distress  Abdomen: Nontender  Skin:  No pathologic rash.   Extremities: Well perfused  Psychiatric: Affect normal    Labs  Results for orders placed or performed during the hospital encounter of 25   POC BREATHALIZER    Collection Time: 25  1:29 AM   Result Value Ref Range    POC Breathalizer 0.00 0.00 - 0.01 Percent   Urine Drug Screen    Collection Time: 25  1:30 AM   Result Value Ref Range    Amphetamines Urine Positive (A) Negative    Barbiturates Negative Negative    Benzodiazepines Negative Negative    Cocaine Metabolite Negative Negative    Fentanyl, Urine Positive (A) Negative    Methadone Negative Negative    Opiates Negative Negative    Oxycodone Negative Negative    Phencyclidine -Pcp Negative Negative    Propoxyphene Negative Negative    Cannabinoid Metab Positive (A) Negative       Radiology  No orders to display       Medications:   New Prescriptions    No medications on file       My final assessment includes suicidal ideation ketamine abuse.  Upon Reevaluation, the patient's condition has: not improved; and will " be escalated to hospitalization.    Patient discharged from ED Observation status at 1111 (Time) 3/4/25 (Date).     Total time spent on this ED Observation discharge encounter is < 30 Minutes    Electronically signed by: Dirk Hirsch M.D., 3/4/2025 10:17 AM

## 2025-03-04 NOTE — ED NOTES
RN rounded on pt for vitals, pt followed RN direction to get back on gurney. Sitter remains in view of pt LOS for 1:1 obs continued

## 2025-03-04 NOTE — PROGRESS NOTES
"ED Observation Progress Note    Date of Service: 03/04/25    Interval History and Interventions  No acute events overnight.  Mildly nauseous and given Zofran.  Stable at this time.  Awaiting transfer for suicidality    Physical Exam  /68   Pulse 74   Temp 36.6 °C (97.8 °F) (Temporal)   Resp 20   Ht 1.778 m (5' 10\")   Wt 74.9 kg (165 lb 2 oz)   SpO2 94%   BMI 23.69 kg/m² .    Constitutional: Awake and alert. Nontoxic  HENT:  Grossly normal  Eyes: Grossly normal  Neck: Normal range of motion  Cardiovascular: Normal heart rate   Thorax & Lungs: No respiratory distress  Abdomen: Nontender  Skin:  No pathologic rash.   Extremities: Well perfused  Psychiatric: Affect normal    Labs  Results for orders placed or performed during the hospital encounter of 03/04/25   POC BREATHALIZER    Collection Time: 03/04/25  1:29 AM   Result Value Ref Range    POC Breathalizer 0.00 0.00 - 0.01 Percent   Urine Drug Screen    Collection Time: 03/04/25  1:30 AM   Result Value Ref Range    Amphetamines Urine Positive (A) Negative    Barbiturates Negative Negative    Benzodiazepines Negative Negative    Cocaine Metabolite Negative Negative    Fentanyl, Urine Positive (A) Negative    Methadone Negative Negative    Opiates Negative Negative    Oxycodone Negative Negative    Phencyclidine -Pcp Negative Negative    Propoxyphene Negative Negative    Cannabinoid Metab Positive (A) Negative       Radiology  No orders to display       Problem List  1.  Suicidal ideation    Electronically signed by: Dirk Hirsch M.D., 3/4/2025 8:49 AM          "

## 2025-03-04 NOTE — ED NOTES
Pt resting in bed, breathing even and unlabored with occasional BLE sporadic movement. Sitter remains in LOS of pt 1:1 obs continued

## 2025-03-04 NOTE — ED NOTES
Pt remains on ground at this time with no signs of distress, request to remain on ground at this time.Sitter remains in LOS of pt for 1:1 obs

## 2025-03-04 NOTE — ED NOTES
Report given to receiving RN, pt changed into paper scrubs and all belongings removed from room.  initiated, 1:1 sitter

## 2025-03-04 NOTE — ED PROVIDER NOTES
"ER Provider Note    Scribed for CELESTE Murillo* by Jomar Mena. 3/4/2025  1:21 AM    Primary Care Provider: Pcp Not In Computer    CHIEF COMPLAINT  Chief Complaint   Patient presents with    Suicidal Ideation     Admits to using meth tonight and says he is here for SI. Hx of attempts in 2003. Pt says he has a plan to shoot up with \"a lot\" of fentanyl like he did in 2003 when he coded for ten minutes.        EXTERNAL RECORDS REVIEWED  Inpatient Notes Patient was admitted to adult psych unit at [facility] 3/21/2024. History of schizoaffective disorder bipolar type. Found to have command auditory hallucinations instructing him to kill himself by lacerating his wrists and overdosing on methamphetamine.      HPI/ROS  LIMITATION TO HISTORY   Select: : None    OUTSIDE HISTORIAN(S):  None    Fortino Song is a 44 y.o. male who presents to the ED for evaluation of suicidal ideation. The patient reports a history of SI attempt in 2003, and states he is suicidal currently. He states he has a plan in place to kill himself by using too much methamphetamine. He also states 1 day ago he attempted to cut himself, noting a scar to his left forearm. He reports using methaphetamine tonight prior to arrival.  Patient reports plan to intentionally overdose on fentanyl.    PAST MEDICAL HISTORY  Past Medical History:   Diagnosis Date    Back pain     Broken tooth     HIV (human immunodeficiency virus infection) (HCC)     Psychiatric disorder     bipolar, schizo affective    Sepsis (HCC) 2/1/2020    Substance abuse (HCC)        SURGICAL HISTORY  Past Surgical History:   Procedure Laterality Date    EXPLORATORY LAPAROTOMY N/A 10/14/2015    Procedure: EXPLORATORY LAPAROTOMY;  Surgeon: Mo Khan M.D.;  Location: SURGERY [facility];  Service:     EXPLORATORY LAPAROTOMY  10/14/2015       FAMILY HISTORY  No family history on file.    SOCIAL HISTORY   reports that he has been smoking cigarettes. He has a 10 " "pack-year smoking history. He uses smokeless tobacco. He reports that he does not currently use drugs after having used the following drugs: Oral and Inhaled. He reports that he does not drink alcohol.    CURRENT MEDICATIONS  Previous Medications    ACETAMINOPHEN (TYLENOL) 500 MG TAB    Take 1,000 mg by mouth every 6 hours as needed for Moderate Pain.    BICTEGRAVIR-EMTRICITAB-TENOFOV (BIKTARVY) -25 MG TAB    Take 1 Tab by mouth every day.    LAMOTRIGINE (LAMICTAL ODT) 42 X 50 MG & 14X100 MG KIT    Take 50 mg by mouth 2 Times a Day.    LIDOCAINE (XYLOCAINE) 2 % SOLUTION    Take 15 mL by mouth every 3 hours as needed for Throat/Mouth Pain.       ALLERGIES  Other drug    PHYSICAL EXAM  /69   Pulse 71   Temp 36.6 °C (97.8 °F) (Temporal)   Resp 16   Ht 1.778 m (5' 10\")   Wt 74.9 kg (165 lb 2 oz)   SpO2 98%   BMI 23.69 kg/m²   Constitutional: Alert, Mildly restless   HENT: No signs of trauma, Bilateral external ears normal, Nose normal.   Eyes: Pupils are equal and reactive, Conjunctiva normal, Non-icteric.   Neck: Normal range of motion, No tenderness, Supple, No stridor.   Cardiovascular: Regular rate and rhythm, no murmurs.   Thorax & Lungs: Normal breath sounds, No respiratory distress, No wheezing, No chest tenderness.   Abdomen: Bowel sounds normal, Soft, No tenderness, No masses, No pulsatile masses.   Skin: Warm, Dry, No erythema, No rash. Healing linear abrasion cords left forearm.   Back: No bony tenderness, No CVA tenderness.   Extremities: Intact distal pulses, No edema, No tenderness, No cyanosis  Musculoskeletal: Good range of motion in all major joints. No tenderness to palpation or major deformities noted.   Neurologic: Alert, Normal motor function, Normal sensory function, No focal deficits noted.           DIAGNOSTIC STUDIES & PROCEDURES    Labs:   Results for orders placed or performed during the hospital encounter of 03/04/25   POC BREATHALIZER    Collection Time: 03/04/25  1:29 AM "   Result Value Ref Range    POC Breathalizer 0.00 0.00 - 0.01 Percent   Urine Drug Screen    Collection Time: 03/04/25  1:30 AM   Result Value Ref Range    Amphetamines Urine Positive (A) Negative    Barbiturates Negative Negative    Benzodiazepines Negative Negative    Cocaine Metabolite Negative Negative    Fentanyl, Urine Positive (A) Negative    Methadone Negative Negative    Opiates Negative Negative    Oxycodone Negative Negative    Phencyclidine -Pcp Negative Negative    Propoxyphene Negative Negative    Cannabinoid Metab Positive (A) Negative       All labs reviewed by me.             COURSE & MEDICAL DECISION MAKING    ED OBS: Yes; I am placing the patient in to an observation status due to a diagnostic uncertainty as well as therapeutic intensity. Patient placed in observation status at 0200 AM, 3/4/2025.     Observation plan is as follows: Monitor for agitation while awaiting psychiatric placement         INITIAL ASSESSMENT AND PLAN  Care Narrative: Patient presenting with suicidal ideations and plan to intentionally overdose when she has attempted before.  Patient with recent methamphetamine use but is calm and speaking clearly during my examination with a clear plan to intentionally overdose on fentanyl.  Patient was seen by behavioral health who agrees with plan for psychiatric hospitalization.  Patient was placed on legal hold.  Patient was given dose of benzodiazepine.  Patient does not have any signs of acute infectious process or traumatic injury.  Patient is medically cleared for further psychiatric care.      1:21 AM - Patient seen and evaluated at bedside. Discussed plan of care, including medication and consultation with the Alert Team. Patient agrees to plan of care. Patient will be treated with Valium 10 mg PO for his symptoms.                    DISPOSITION AND DISCUSSIONS    Discussion of management with other QHP or appropriate source(s):  Alert Team who recommend inpatient psychiatric  hospitalization        Barriers to care at this time, including but not limited to: Patient is homeless.             FINAL IMPRESSION   1. Suicidal ideation    2. Methamphetamine abuse (HCC)        IJomar (Scribe), am scribing for, and in the presence of, Bro Monge D.O.    Electronically signed by: Jomar Mena (Scribe), 3/4/2025    IBro D.O. personally performed the services described in this documentation, as scribed by Jomar Mena in my presence, and it is both accurate and complete.    The note accurately reflects work and decisions made by me.  Bro Monge D.O.  3/4/2025  2:48 AM

## 2025-03-04 NOTE — DISCHARGE PLANNING
Alert Team Note     Spoke to Sil at [facility], referral received and confirmed pt is still pending placement. Referral pending review.    @ [phone] attempted to contact [facility]. No answer and left confidential VM with call back information provided     @[phone] Spoke to Whitney at [facility], referral received. Was advised they are unable to accommodate pt's Biktarvy medication and requesting pt has medication filled. Notified Alert Team OSBALDO Meza. @[phone], per alert team RN, pt has a full bottle of medication in his backpack. Notified Chacha at [facility]

## 2025-03-04 NOTE — ED NOTES
Jaleesa called RN to room after pt got off gurney and laid down on floor, when prompted by RN for questioning, pt stated he wanted to lay down on the floor and did not want to get back on the gurney. No signs of distress noted and sitter remain in LOS of pt for 1:1 obs